# Patient Record
Sex: FEMALE | Race: OTHER | NOT HISPANIC OR LATINO | URBAN - METROPOLITAN AREA
[De-identification: names, ages, dates, MRNs, and addresses within clinical notes are randomized per-mention and may not be internally consistent; named-entity substitution may affect disease eponyms.]

---

## 2023-04-25 ENCOUNTER — EMERGENCY (EMERGENCY)
Facility: HOSPITAL | Age: 63
LOS: 1 days | Discharge: HOME | End: 2023-04-25
Attending: EMERGENCY MEDICINE
Payer: COMMERCIAL

## 2023-04-25 VITALS
TEMPERATURE: 101 F | RESPIRATION RATE: 20 BRPM | SYSTOLIC BLOOD PRESSURE: 173 MMHG | WEIGHT: 242.95 LBS | DIASTOLIC BLOOD PRESSURE: 90 MMHG | HEIGHT: 66 IN | HEART RATE: 91 BPM | OXYGEN SATURATION: 96 %

## 2023-04-25 DIAGNOSIS — Z90.49 ACQUIRED ABSENCE OF OTHER SPECIFIED PARTS OF DIGESTIVE TRACT: Chronic | ICD-10-CM

## 2023-04-25 LAB
ALBUMIN SERPL ELPH-MCNC: 4.5 G/DL — SIGNIFICANT CHANGE UP (ref 3.3–5)
ALP SERPL-CCNC: 67 U/L — SIGNIFICANT CHANGE UP (ref 40–120)
ALT FLD-CCNC: 29 U/L — SIGNIFICANT CHANGE UP (ref 10–45)
ANION GAP SERPL CALC-SCNC: 15 MMOL/L — SIGNIFICANT CHANGE UP (ref 5–17)
APPEARANCE UR: CLEAR — SIGNIFICANT CHANGE UP
AST SERPL-CCNC: 40 U/L — SIGNIFICANT CHANGE UP (ref 10–40)
BACTERIA # UR AUTO: NEGATIVE — SIGNIFICANT CHANGE UP
BASE EXCESS BLDV CALC-SCNC: 6.8 MMOL/L — HIGH (ref -2–3)
BASOPHILS # BLD AUTO: 0.04 K/UL — SIGNIFICANT CHANGE UP (ref 0–0.2)
BASOPHILS NFR BLD AUTO: 0.5 % — SIGNIFICANT CHANGE UP (ref 0–2)
BILIRUB SERPL-MCNC: 0.5 MG/DL — SIGNIFICANT CHANGE UP (ref 0.2–1.2)
BILIRUB UR-MCNC: NEGATIVE — SIGNIFICANT CHANGE UP
BLOOD GAS VENOUS - CREATININE: SIGNIFICANT CHANGE UP MG/DL (ref 0.5–1.3)
BUN SERPL-MCNC: 14 MG/DL — SIGNIFICANT CHANGE UP (ref 7–23)
CA-I SERPL-SCNC: 1.25 MMOL/L — SIGNIFICANT CHANGE UP (ref 1.15–1.33)
CALCIUM SERPL-MCNC: 10.3 MG/DL — SIGNIFICANT CHANGE UP (ref 8.4–10.5)
CHLORIDE BLDV-SCNC: 97 MMOL/L — SIGNIFICANT CHANGE UP (ref 96–108)
CHLORIDE SERPL-SCNC: 93 MMOL/L — LOW (ref 96–108)
CO2 BLDV-SCNC: 34 MMOL/L — HIGH (ref 22–26)
CO2 SERPL-SCNC: 27 MMOL/L — SIGNIFICANT CHANGE UP (ref 22–31)
COLOR SPEC: COLORLESS — SIGNIFICANT CHANGE UP
CREAT SERPL-MCNC: 0.66 MG/DL — SIGNIFICANT CHANGE UP (ref 0.5–1.3)
DIFF PNL FLD: ABNORMAL
EGFR: 100 ML/MIN/1.73M2 — SIGNIFICANT CHANGE UP
EOSINOPHIL # BLD AUTO: 0.03 K/UL — SIGNIFICANT CHANGE UP (ref 0–0.5)
EOSINOPHIL NFR BLD AUTO: 0.4 % — SIGNIFICANT CHANGE UP (ref 0–6)
EPI CELLS # UR: 2 /HPF — SIGNIFICANT CHANGE UP
GAS PNL BLDV: 134 MMOL/L — LOW (ref 136–145)
GAS PNL BLDV: SIGNIFICANT CHANGE UP
GAS PNL BLDV: SIGNIFICANT CHANGE UP
GLUCOSE BLDV-MCNC: 97 MG/DL — SIGNIFICANT CHANGE UP (ref 70–99)
GLUCOSE SERPL-MCNC: 95 MG/DL — SIGNIFICANT CHANGE UP (ref 70–99)
GLUCOSE UR QL: NEGATIVE — SIGNIFICANT CHANGE UP
HCO3 BLDV-SCNC: 33 MMOL/L — HIGH (ref 22–29)
HCT VFR BLD CALC: 38.4 % — SIGNIFICANT CHANGE UP (ref 34.5–45)
HCT VFR BLDA CALC: 33 % — LOW (ref 34.5–46.5)
HGB BLD CALC-MCNC: 11 G/DL — LOW (ref 11.7–16.1)
HGB BLD-MCNC: 12.3 G/DL — SIGNIFICANT CHANGE UP (ref 11.5–15.5)
HYALINE CASTS # UR AUTO: 0 /LPF — SIGNIFICANT CHANGE UP (ref 0–2)
IMM GRANULOCYTES NFR BLD AUTO: 0.4 % — SIGNIFICANT CHANGE UP (ref 0–0.9)
KETONES UR-MCNC: NEGATIVE — SIGNIFICANT CHANGE UP
LACTATE BLDV-MCNC: 1.4 MMOL/L — SIGNIFICANT CHANGE UP (ref 0.5–2)
LEUKOCYTE ESTERASE UR-ACNC: NEGATIVE — SIGNIFICANT CHANGE UP
LIDOCAIN IGE QN: 20 U/L — SIGNIFICANT CHANGE UP (ref 7–60)
LYMPHOCYTES # BLD AUTO: 2.09 K/UL — SIGNIFICANT CHANGE UP (ref 1–3.3)
LYMPHOCYTES # BLD AUTO: 28.2 % — SIGNIFICANT CHANGE UP (ref 13–44)
MCHC RBC-ENTMCNC: 29 PG — SIGNIFICANT CHANGE UP (ref 27–34)
MCHC RBC-ENTMCNC: 32 GM/DL — SIGNIFICANT CHANGE UP (ref 32–36)
MCV RBC AUTO: 90.6 FL — SIGNIFICANT CHANGE UP (ref 80–100)
MONOCYTES # BLD AUTO: 0.65 K/UL — SIGNIFICANT CHANGE UP (ref 0–0.9)
MONOCYTES NFR BLD AUTO: 8.8 % — SIGNIFICANT CHANGE UP (ref 2–14)
NEUTROPHILS # BLD AUTO: 4.58 K/UL — SIGNIFICANT CHANGE UP (ref 1.8–7.4)
NEUTROPHILS NFR BLD AUTO: 61.7 % — SIGNIFICANT CHANGE UP (ref 43–77)
NITRITE UR-MCNC: NEGATIVE — SIGNIFICANT CHANGE UP
NRBC # BLD: 0 /100 WBCS — SIGNIFICANT CHANGE UP (ref 0–0)
PCO2 BLDV: 53 MMHG — HIGH (ref 39–42)
PH BLDV: 7.4 — SIGNIFICANT CHANGE UP (ref 7.32–7.43)
PH UR: 6.5 — SIGNIFICANT CHANGE UP (ref 5–8)
PLATELET # BLD AUTO: 163 K/UL — SIGNIFICANT CHANGE UP (ref 150–400)
PO2 BLDV: 26 MMHG — SIGNIFICANT CHANGE UP (ref 25–45)
POTASSIUM BLDV-SCNC: 3.7 MMOL/L — SIGNIFICANT CHANGE UP (ref 3.5–5.1)
POTASSIUM SERPL-MCNC: 4.1 MMOL/L — SIGNIFICANT CHANGE UP (ref 3.5–5.3)
POTASSIUM SERPL-SCNC: 4.1 MMOL/L — SIGNIFICANT CHANGE UP (ref 3.5–5.3)
PROT SERPL-MCNC: 9.8 G/DL — HIGH (ref 6–8.3)
PROT UR-MCNC: NEGATIVE — SIGNIFICANT CHANGE UP
RAPID RVP RESULT: SIGNIFICANT CHANGE UP
RBC # BLD: 4.24 M/UL — SIGNIFICANT CHANGE UP (ref 3.8–5.2)
RBC # FLD: 13.2 % — SIGNIFICANT CHANGE UP (ref 10.3–14.5)
RBC CASTS # UR COMP ASSIST: 2 /HPF — SIGNIFICANT CHANGE UP (ref 0–4)
SAO2 % BLDV: 36.4 % — LOW (ref 67–88)
SARS-COV-2 RNA SPEC QL NAA+PROBE: SIGNIFICANT CHANGE UP
SODIUM SERPL-SCNC: 135 MMOL/L — SIGNIFICANT CHANGE UP (ref 135–145)
SP GR SPEC: 1.01 — LOW (ref 1.01–1.02)
TROPONIN T, HIGH SENSITIVITY RESULT: 45 NG/L — SIGNIFICANT CHANGE UP (ref 0–51)
TROPONIN T, HIGH SENSITIVITY RESULT: 46 NG/L — SIGNIFICANT CHANGE UP (ref 0–51)
UROBILINOGEN FLD QL: NEGATIVE — SIGNIFICANT CHANGE UP
WBC # BLD: 7.42 K/UL — SIGNIFICANT CHANGE UP (ref 3.8–10.5)
WBC # FLD AUTO: 7.42 K/UL — SIGNIFICANT CHANGE UP (ref 3.8–10.5)
WBC UR QL: 1 /HPF — SIGNIFICANT CHANGE UP (ref 0–5)

## 2023-04-25 PROCEDURE — 99285 EMERGENCY DEPT VISIT HI MDM: CPT

## 2023-04-25 PROCEDURE — 71045 X-RAY EXAM CHEST 1 VIEW: CPT | Mod: 26

## 2023-04-25 RX ORDER — POTASSIUM CHLORIDE 20 MEQ
40 PACKET (EA) ORAL ONCE
Refills: 0 | Status: COMPLETED | OUTPATIENT
Start: 2023-04-25 | End: 2023-04-25

## 2023-04-25 NOTE — ED PROVIDER NOTE - ATTENDING CONTRIBUTION TO CARE
Patient is a 62-year-old female with a history of hypertension, asthma, history of cholecystectomy here for evaluation of indigestion, bloating and fatigue x 3 days.  Patient states that they went to some event on Saturday and she felt that the food had too much garlic which causes indigestion.  The following day she had a lot of bloating and gas.  She reports that she was burping and passing gas repeatedly.  She also reports a less than 1 minute episode where she felt dizzy while sitting on the couch.  Patient states that she went online and check Web MD and decided that she should probably get checked out because of a strong cardiac history in her family.  Patient went to urgent care and was advised to come to the emergency department for further evaluation.  She reports that she follows with Dr. Grossman and had a echo and stress test that was normal in December 2022.  Patient denies any chest pain with exertion or dyspnea on exertion.  She denies any leg swelling.  She also reports that it was a recent death in the family which may have added to the stress of her situation.  She denies any fevers, vomiting, diarrhea.  She denies any urinary symptoms.    VS noted  Gen. no acute distress, Non toxic   HEENT: EOMI, mmm  Lungs: CTAB/L no C/ W /R   CVS: RRR   Abd; Soft non tender, non distended   Ext: no edema  Skin: no rash  Neuro AAOx3 non focal clear speech  a/p:   Indigestion–patient was sent from urgent care for concern of abnormal EKG.  EKG is reviewed and is low voltage.  EKG done here shows no ST elevations or depressions.  Plan for labs including troponin.  Given patient recently had a stress and echo that was within normal limits, if  delta troponin is normal, plan for discharge and instructions for outpatient follow-up.  - Roxana BIRCH

## 2023-04-25 NOTE — ED PROVIDER NOTE - OBJECTIVE STATEMENT
62-year-old female with past medical history of asthma, hypertension, cholecystectomy, here for 2 days of bloating, fatigue, mild shortness of breath.  She went to an urgent care today for evaluation and was sent to the emergency department due to an abnormal EKG ( interpreted EKG as low voltage QRS with flattened T waves in precordials). She denies chest pain and does not have worsened SOB with exertion. She recently underwent a stress test with her cardiologist in December which was unremarkable.

## 2023-04-25 NOTE — ED PROVIDER NOTE - RAPID ASSESSMENT
pt is a 61 y/o female from UC, htn, surgery choley p/w with bloating, Indigestion.  Mild shortness of breath, No chest pain. mild nauseous, no diarrhea, febrile here, no sick contacts, no uri sts. fatigue all started the past few days now. rapid covid neg,  dr gandara in dec echo, stress which was nl. urinary freq with pos ua from urgent care.     *** I, Shane Fuentes MD, performed an initial face to face bedside interview with this patient regarding history of present illness and determined that the patient should be evaluated in the main ED. A physical exam was not performed due to private space availability. This patient's evaluation is NOT COMPLETE and only preliminary. The full assessment, management, and reassessment of this patient, including but not limited to the follow up of ordered laboratory and radiologic testing, is deferred to the main ED provider. ***

## 2023-04-25 NOTE — ED PROVIDER NOTE - PROGRESS NOTE DETAILS
Jean Morgan MD, PGY-1: First troponin 45, will obtain second for correlation. Jean Morgan MD, PGY-1: Second troponin lateral, patient safe for discharge.

## 2023-04-25 NOTE — ED PROVIDER NOTE - CLINICAL SUMMARY MEDICAL DECISION MAKING FREE TEXT BOX
62F here for 2 days fatigue, SOB, bloating. Exam shows no abdominal tenderness and clear lungs. EKG is unremarkable. Patient already has done recent cardiac workup and can follow up with cardiologist. Labs were ordered by [facility] and do not show evidence of pancreatitis, infection, anemia, or electrolyte abnormality that would explain abdominal discomfort or fatigue. Patient can follow up with cardiologist.

## 2023-04-25 NOTE — ED ADULT TRIAGE NOTE - CHIEF COMPLAINT QUOTE
sent from  for abnormal EKG, pt c/o abdominal distention, fevers, intermittent SOB and generalized fatigue beginning 2 days ago.

## 2023-04-25 NOTE — ED PROVIDER NOTE - NSFOLLOWUPINSTRUCTIONS_ED_ALL_ED_FT
You were seen in the emergency department for abdominal discomfort. We have evaluated you and determined that you do not require further hospital interventions.    During your stay you had the following relevant results: an EKG that does not show evidence of a heart attack, lab work that shows normal electrolyte levels and normal red blood cell count    Please follow up with your CARDIOLOGIST to discuss the results of your stay in our department.    If you start to experience worsening symptoms such as chest pain (especially with nausea or vomiting), difficulty breathing, high-grade fevers (>= 103 °F) that do not respond to medication, please return to the emergency department for further evaluation.

## 2023-04-25 NOTE — ED PROVIDER NOTE - PATIENT PORTAL LINK FT
You can access the FollowMyHealth Patient Portal offered by [facility] by registering at the following website: [email or web address]. By joining [facility]’s FollowMyHealth portal, you will also be able to view your health information using other applications (apps) compatible with our system.

## 2023-04-26 VITALS
RESPIRATION RATE: 16 BRPM | TEMPERATURE: 99 F | DIASTOLIC BLOOD PRESSURE: 74 MMHG | HEART RATE: 77 BPM | OXYGEN SATURATION: 98 % | SYSTOLIC BLOOD PRESSURE: 126 MMHG

## 2023-04-26 PROCEDURE — 82435 ASSAY OF BLOOD CHLORIDE: CPT

## 2023-04-26 PROCEDURE — 83605 ASSAY OF LACTIC ACID: CPT

## 2023-04-26 PROCEDURE — 82803 BLOOD GASES ANY COMBINATION: CPT

## 2023-04-26 PROCEDURE — 87086 URINE CULTURE/COLONY COUNT: CPT

## 2023-04-26 PROCEDURE — 82330 ASSAY OF CALCIUM: CPT

## 2023-04-26 PROCEDURE — 85018 HEMOGLOBIN: CPT

## 2023-04-26 PROCEDURE — 81001 URINALYSIS AUTO W/SCOPE: CPT

## 2023-04-26 PROCEDURE — 80048 BASIC METABOLIC PNL TOTAL CA: CPT

## 2023-04-26 PROCEDURE — 85025 COMPLETE CBC W/AUTO DIFF WBC: CPT

## 2023-04-26 PROCEDURE — 0225U NFCT DS DNA&RNA 21 SARSCOV2: CPT

## 2023-04-26 PROCEDURE — 93005 ELECTROCARDIOGRAM TRACING: CPT

## 2023-04-26 PROCEDURE — 85014 HEMATOCRIT: CPT

## 2023-04-26 PROCEDURE — 84484 ASSAY OF TROPONIN QUANT: CPT

## 2023-04-26 PROCEDURE — 82565 ASSAY OF CREATININE: CPT

## 2023-04-26 PROCEDURE — 83690 ASSAY OF LIPASE: CPT

## 2023-04-26 PROCEDURE — 84295 ASSAY OF SERUM SODIUM: CPT

## 2023-04-26 PROCEDURE — 84132 ASSAY OF SERUM POTASSIUM: CPT

## 2023-04-26 PROCEDURE — 99285 EMERGENCY DEPT VISIT HI MDM: CPT | Mod: 25

## 2023-04-26 PROCEDURE — 71045 X-RAY EXAM CHEST 1 VIEW: CPT

## 2023-04-26 PROCEDURE — 80053 COMPREHEN METABOLIC PANEL: CPT

## 2023-04-26 PROCEDURE — 82947 ASSAY GLUCOSE BLOOD QUANT: CPT

## 2023-04-26 RX ADMIN — Medication 40 MILLIEQUIVALENT(S): at 00:24

## 2023-04-26 NOTE — ED ADULT NURSE NOTE - CHPI ED NUR SYMPTOMS NEG
no back pain/no chest pain/no chills/no congestion/no diaphoresis/no dizziness/no nausea/no syncope/no vomiting

## 2023-04-26 NOTE — ED ADULT NURSE NOTE - OBJECTIVE STATEMENT
62 y/o Female presents to ED from urgent care for abn EKG. PMH asthma, HTN, cholecystectomy. Pt states she has had bloating and SOB on exertion for the past 2 days. Pt was seen at urgent care and sent to ED for abn EKG. Pt denied chest pain, numbness or tingling, HA, cough, dizziness, vision changes, N/V/D, urinary s/s. Endorses bloated feeling and gassy. Pt is A&Ox4, well appearing/ speaking full sentences without difficulty. Airway patent with spontaneous unlabored breathing, Equal B/L upper and lower extremity strength, skin is warm and normal color for race. No diaphoresis noted. Pt placed on continuous cardiac monitor. Safety maintained bed is in the lowest position, locked and call bell in reach.

## 2023-04-27 LAB
CULTURE RESULTS: SIGNIFICANT CHANGE UP
SPECIMEN SOURCE: SIGNIFICANT CHANGE UP

## 2024-02-17 ENCOUNTER — NON-APPOINTMENT (OUTPATIENT)
Age: 64
End: 2024-02-17

## 2024-02-28 PROBLEM — I10 ESSENTIAL (PRIMARY) HYPERTENSION: Chronic | Status: ACTIVE | Noted: 2023-04-25

## 2024-02-28 PROBLEM — J45.909 UNSPECIFIED ASTHMA, UNCOMPLICATED: Chronic | Status: ACTIVE | Noted: 2023-04-25

## 2024-02-29 PROBLEM — Z00.00 ENCOUNTER FOR PREVENTIVE HEALTH EXAMINATION: Status: ACTIVE | Noted: 2024-02-29

## 2024-03-06 ENCOUNTER — APPOINTMENT (OUTPATIENT)
Dept: GYNECOLOGIC ONCOLOGY | Facility: CLINIC | Age: 64
End: 2024-03-06

## 2024-03-16 ENCOUNTER — INPATIENT (INPATIENT)
Facility: HOSPITAL | Age: 64
LOS: 3 days | Discharge: HOME HEALTH | DRG: 512 | End: 2024-03-20
Attending: OBSTETRICS & GYNECOLOGY | Admitting: OBSTETRICS & GYNECOLOGY
Payer: MEDICAID

## 2024-03-16 VITALS
TEMPERATURE: 98 F | HEIGHT: 66 IN | HEART RATE: 68 BPM | SYSTOLIC BLOOD PRESSURE: 168 MMHG | DIASTOLIC BLOOD PRESSURE: 82 MMHG | RESPIRATION RATE: 18 BRPM | WEIGHT: 238.98 LBS | OXYGEN SATURATION: 99 %

## 2024-03-16 DIAGNOSIS — Z90.49 ACQUIRED ABSENCE OF OTHER SPECIFIED PARTS OF DIGESTIVE TRACT: Chronic | ICD-10-CM

## 2024-03-16 LAB
ALBUMIN SERPL ELPH-MCNC: 4 G/DL — SIGNIFICANT CHANGE UP (ref 3.5–5.2)
ALP SERPL-CCNC: 57 U/L — SIGNIFICANT CHANGE UP (ref 30–115)
ALT FLD-CCNC: 36 U/L — SIGNIFICANT CHANGE UP (ref 0–41)
ANION GAP SERPL CALC-SCNC: 12 MMOL/L — SIGNIFICANT CHANGE UP (ref 7–14)
AST SERPL-CCNC: 34 U/L — SIGNIFICANT CHANGE UP (ref 0–41)
BILIRUB SERPL-MCNC: 0.2 MG/DL — SIGNIFICANT CHANGE UP (ref 0.2–1.2)
BLD GP AB SCN SERPL QL: SIGNIFICANT CHANGE UP
BUN SERPL-MCNC: 12 MG/DL — SIGNIFICANT CHANGE UP (ref 10–20)
CALCIUM SERPL-MCNC: 9.4 MG/DL — SIGNIFICANT CHANGE UP (ref 8.4–10.5)
CHLORIDE SERPL-SCNC: 103 MMOL/L — SIGNIFICANT CHANGE UP (ref 98–110)
CO2 SERPL-SCNC: 25 MMOL/L — SIGNIFICANT CHANGE UP (ref 17–32)
CREAT SERPL-MCNC: 0.6 MG/DL — LOW (ref 0.7–1.5)
EGFR: 101 ML/MIN/1.73M2 — SIGNIFICANT CHANGE UP
GLUCOSE SERPL-MCNC: 150 MG/DL — HIGH (ref 70–99)
LIDOCAIN IGE QN: 22 U/L — SIGNIFICANT CHANGE UP (ref 7–60)
POTASSIUM SERPL-MCNC: 3.7 MMOL/L — SIGNIFICANT CHANGE UP (ref 3.5–5)
POTASSIUM SERPL-SCNC: 3.7 MMOL/L — SIGNIFICANT CHANGE UP (ref 3.5–5)
PROT SERPL-MCNC: 7.6 G/DL — SIGNIFICANT CHANGE UP (ref 6–8)
SODIUM SERPL-SCNC: 140 MMOL/L — SIGNIFICANT CHANGE UP (ref 135–146)

## 2024-03-16 PROCEDURE — 99285 EMERGENCY DEPT VISIT HI MDM: CPT

## 2024-03-16 RX ORDER — SODIUM CHLORIDE 9 MG/ML
1000 INJECTION, SOLUTION INTRAVENOUS ONCE
Refills: 0 | Status: COMPLETED | OUTPATIENT
Start: 2024-03-16 | End: 2024-03-16

## 2024-03-16 RX ORDER — IOHEXOL 300 MG/ML
30 INJECTION, SOLUTION INTRAVENOUS ONCE
Refills: 0 | Status: COMPLETED | OUTPATIENT
Start: 2024-03-16 | End: 2024-03-17

## 2024-03-16 RX ADMIN — SODIUM CHLORIDE 1000 MILLILITER(S): 9 INJECTION, SOLUTION INTRAVENOUS at 22:03

## 2024-03-17 DIAGNOSIS — N93.9 ABNORMAL UTERINE AND VAGINAL BLEEDING, UNSPECIFIED: ICD-10-CM

## 2024-03-17 DIAGNOSIS — Z98.891 HISTORY OF UTERINE SCAR FROM PREVIOUS SURGERY: Chronic | ICD-10-CM

## 2024-03-17 LAB
ALBUMIN SERPL ELPH-MCNC: 3.9 G/DL — SIGNIFICANT CHANGE UP (ref 3.5–5.2)
ALP SERPL-CCNC: 55 U/L — SIGNIFICANT CHANGE UP (ref 30–115)
ALT FLD-CCNC: 32 U/L — SIGNIFICANT CHANGE UP (ref 0–41)
ANION GAP SERPL CALC-SCNC: 11 MMOL/L — SIGNIFICANT CHANGE UP (ref 7–14)
APTT BLD: 27.2 SEC — SIGNIFICANT CHANGE UP (ref 27–39.2)
AST SERPL-CCNC: 28 U/L — SIGNIFICANT CHANGE UP (ref 0–41)
BASOPHILS # BLD AUTO: 0.03 K/UL — SIGNIFICANT CHANGE UP (ref 0–0.2)
BASOPHILS # BLD AUTO: 0.04 K/UL — SIGNIFICANT CHANGE UP (ref 0–0.2)
BASOPHILS NFR BLD AUTO: 0.4 % — SIGNIFICANT CHANGE UP (ref 0–1)
BASOPHILS NFR BLD AUTO: 0.7 % — SIGNIFICANT CHANGE UP (ref 0–1)
BILIRUB SERPL-MCNC: 0.4 MG/DL — SIGNIFICANT CHANGE UP (ref 0.2–1.2)
BLD GP AB SCN SERPL QL: SIGNIFICANT CHANGE UP
BUN SERPL-MCNC: 8 MG/DL — LOW (ref 10–20)
CALCIUM SERPL-MCNC: 9 MG/DL — SIGNIFICANT CHANGE UP (ref 8.4–10.4)
CHLORIDE SERPL-SCNC: 102 MMOL/L — SIGNIFICANT CHANGE UP (ref 98–110)
CO2 SERPL-SCNC: 26 MMOL/L — SIGNIFICANT CHANGE UP (ref 17–32)
CREAT SERPL-MCNC: 0.6 MG/DL — LOW (ref 0.7–1.5)
EGFR: 101 ML/MIN/1.73M2 — SIGNIFICANT CHANGE UP
EOSINOPHIL # BLD AUTO: 0.01 K/UL — SIGNIFICANT CHANGE UP (ref 0–0.7)
EOSINOPHIL # BLD AUTO: 0.04 K/UL — SIGNIFICANT CHANGE UP (ref 0–0.7)
EOSINOPHIL NFR BLD AUTO: 0.1 % — SIGNIFICANT CHANGE UP (ref 0–8)
EOSINOPHIL NFR BLD AUTO: 0.7 % — SIGNIFICANT CHANGE UP (ref 0–8)
GLUCOSE SERPL-MCNC: 101 MG/DL — HIGH (ref 70–99)
HCT VFR BLD CALC: 32.9 % — LOW (ref 37–47)
HCT VFR BLD CALC: 33.6 % — LOW (ref 37–47)
HGB BLD-MCNC: 10.9 G/DL — LOW (ref 12–16)
HGB BLD-MCNC: 11.1 G/DL — LOW (ref 12–16)
IMM GRANULOCYTES NFR BLD AUTO: 0.2 % — SIGNIFICANT CHANGE UP (ref 0.1–0.3)
IMM GRANULOCYTES NFR BLD AUTO: 0.3 % — SIGNIFICANT CHANGE UP (ref 0.1–0.3)
INR BLD: 1.09 RATIO — SIGNIFICANT CHANGE UP (ref 0.65–1.3)
LYMPHOCYTES # BLD AUTO: 1.7 K/UL — SIGNIFICANT CHANGE UP (ref 1.2–3.4)
LYMPHOCYTES # BLD AUTO: 2.03 K/UL — SIGNIFICANT CHANGE UP (ref 1.2–3.4)
LYMPHOCYTES # BLD AUTO: 25.4 % — SIGNIFICANT CHANGE UP (ref 20.5–51.1)
LYMPHOCYTES # BLD AUTO: 35.6 % — SIGNIFICANT CHANGE UP (ref 20.5–51.1)
MAGNESIUM SERPL-MCNC: 2.2 MG/DL — SIGNIFICANT CHANGE UP (ref 1.8–2.4)
MCHC RBC-ENTMCNC: 29.2 PG — SIGNIFICANT CHANGE UP (ref 27–31)
MCHC RBC-ENTMCNC: 29.4 PG — SIGNIFICANT CHANGE UP (ref 27–31)
MCHC RBC-ENTMCNC: 33 G/DL — SIGNIFICANT CHANGE UP (ref 32–37)
MCHC RBC-ENTMCNC: 33.1 G/DL — SIGNIFICANT CHANGE UP (ref 32–37)
MCV RBC AUTO: 88.4 FL — SIGNIFICANT CHANGE UP (ref 81–99)
MCV RBC AUTO: 88.7 FL — SIGNIFICANT CHANGE UP (ref 81–99)
MONOCYTES # BLD AUTO: 0.42 K/UL — SIGNIFICANT CHANGE UP (ref 0.1–0.6)
MONOCYTES # BLD AUTO: 0.44 K/UL — SIGNIFICANT CHANGE UP (ref 0.1–0.6)
MONOCYTES NFR BLD AUTO: 6.3 % — SIGNIFICANT CHANGE UP (ref 1.7–9.3)
MONOCYTES NFR BLD AUTO: 7.7 % — SIGNIFICANT CHANGE UP (ref 1.7–9.3)
NEUTROPHILS # BLD AUTO: 3.15 K/UL — SIGNIFICANT CHANGE UP (ref 1.4–6.5)
NEUTROPHILS # BLD AUTO: 4.51 K/UL — SIGNIFICANT CHANGE UP (ref 1.4–6.5)
NEUTROPHILS NFR BLD AUTO: 55.1 % — SIGNIFICANT CHANGE UP (ref 42.2–75.2)
NEUTROPHILS NFR BLD AUTO: 67.5 % — SIGNIFICANT CHANGE UP (ref 42.2–75.2)
NRBC # BLD: 0 /100 WBCS — SIGNIFICANT CHANGE UP (ref 0–0)
NRBC # BLD: 0 /100 WBCS — SIGNIFICANT CHANGE UP (ref 0–0)
PHOSPHATE SERPL-MCNC: 4.1 MG/DL — SIGNIFICANT CHANGE UP (ref 2.1–4.9)
PLATELET # BLD AUTO: 186 K/UL — SIGNIFICANT CHANGE UP (ref 130–400)
PLATELET # BLD AUTO: 188 K/UL — SIGNIFICANT CHANGE UP (ref 130–400)
PMV BLD: 10.5 FL — HIGH (ref 7.4–10.4)
PMV BLD: 10.8 FL — HIGH (ref 7.4–10.4)
POTASSIUM SERPL-MCNC: 3.6 MMOL/L — SIGNIFICANT CHANGE UP (ref 3.5–5)
POTASSIUM SERPL-SCNC: 3.6 MMOL/L — SIGNIFICANT CHANGE UP (ref 3.5–5)
PROT SERPL-MCNC: 7.2 G/DL — SIGNIFICANT CHANGE UP (ref 6–8)
PROTHROM AB SERPL-ACNC: 12.4 SEC — SIGNIFICANT CHANGE UP (ref 9.95–12.87)
RBC # BLD: 3.71 M/UL — LOW (ref 4.2–5.4)
RBC # BLD: 3.8 M/UL — LOW (ref 4.2–5.4)
RBC # FLD: 13.2 % — SIGNIFICANT CHANGE UP (ref 11.5–14.5)
RBC # FLD: 13.3 % — SIGNIFICANT CHANGE UP (ref 11.5–14.5)
SODIUM SERPL-SCNC: 139 MMOL/L — SIGNIFICANT CHANGE UP (ref 135–146)
WBC # BLD: 5.71 K/UL — SIGNIFICANT CHANGE UP (ref 4.8–10.8)
WBC # BLD: 6.69 K/UL — SIGNIFICANT CHANGE UP (ref 4.8–10.8)
WBC # FLD AUTO: 5.71 K/UL — SIGNIFICANT CHANGE UP (ref 4.8–10.8)
WBC # FLD AUTO: 6.69 K/UL — SIGNIFICANT CHANGE UP (ref 4.8–10.8)

## 2024-03-17 PROCEDURE — 74177 CT ABD & PELVIS W/CONTRAST: CPT | Mod: 26

## 2024-03-17 PROCEDURE — 36415 COLL VENOUS BLD VENIPUNCTURE: CPT

## 2024-03-17 PROCEDURE — 93005 ELECTROCARDIOGRAM TRACING: CPT

## 2024-03-17 PROCEDURE — 88309 TISSUE EXAM BY PATHOLOGIST: CPT

## 2024-03-17 PROCEDURE — 88305 TISSUE EXAM BY PATHOLOGIST: CPT

## 2024-03-17 PROCEDURE — 86850 RBC ANTIBODY SCREEN: CPT

## 2024-03-17 PROCEDURE — 83735 ASSAY OF MAGNESIUM: CPT

## 2024-03-17 PROCEDURE — 76856 US EXAM PELVIC COMPLETE: CPT | Mod: 26

## 2024-03-17 PROCEDURE — C9399: CPT

## 2024-03-17 PROCEDURE — 84100 ASSAY OF PHOSPHORUS: CPT

## 2024-03-17 PROCEDURE — 85730 THROMBOPLASTIN TIME PARTIAL: CPT

## 2024-03-17 PROCEDURE — 85610 PROTHROMBIN TIME: CPT

## 2024-03-17 PROCEDURE — 99222 1ST HOSP IP/OBS MODERATE 55: CPT | Mod: GC,57

## 2024-03-17 PROCEDURE — 86900 BLOOD TYPING SEROLOGIC ABO: CPT

## 2024-03-17 PROCEDURE — 86803 HEPATITIS C AB TEST: CPT

## 2024-03-17 PROCEDURE — 80053 COMPREHEN METABOLIC PANEL: CPT

## 2024-03-17 PROCEDURE — 83036 HEMOGLOBIN GLYCOSYLATED A1C: CPT

## 2024-03-17 PROCEDURE — 85025 COMPLETE CBC W/AUTO DIFF WBC: CPT

## 2024-03-17 PROCEDURE — 99232 SBSQ HOSP IP/OBS MODERATE 35: CPT

## 2024-03-17 PROCEDURE — 80048 BASIC METABOLIC PNL TOTAL CA: CPT

## 2024-03-17 PROCEDURE — 88307 TISSUE EXAM BY PATHOLOGIST: CPT

## 2024-03-17 PROCEDURE — 93010 ELECTROCARDIOGRAM REPORT: CPT

## 2024-03-17 PROCEDURE — 74177 CT ABD & PELVIS W/CONTRAST: CPT | Mod: MC

## 2024-03-17 PROCEDURE — 86901 BLOOD TYPING SEROLOGIC RH(D): CPT

## 2024-03-17 PROCEDURE — 88341 IMHCHEM/IMCYTCHM EA ADD ANTB: CPT

## 2024-03-17 PROCEDURE — 88342 IMHCHEM/IMCYTCHM 1ST ANTB: CPT

## 2024-03-17 RX ORDER — TRIAMTERENE/HYDROCHLOROTHIAZID 75 MG-50MG
1 TABLET ORAL DAILY
Refills: 0 | Status: DISCONTINUED | OUTPATIENT
Start: 2024-03-17 | End: 2024-03-17

## 2024-03-17 RX ORDER — IBUPROFEN 200 MG
600 TABLET ORAL EVERY 6 HOURS
Refills: 0 | Status: DISCONTINUED | OUTPATIENT
Start: 2024-03-17 | End: 2024-03-18

## 2024-03-17 RX ORDER — ACETAMINOPHEN 500 MG
975 TABLET ORAL EVERY 6 HOURS
Refills: 0 | Status: DISCONTINUED | OUTPATIENT
Start: 2024-03-17 | End: 2024-03-18

## 2024-03-17 RX ORDER — ACETAMINOPHEN 500 MG
1000 TABLET ORAL ONCE
Refills: 0 | Status: COMPLETED | OUTPATIENT
Start: 2024-03-18 | End: 2024-03-18

## 2024-03-17 RX ORDER — HEPARIN SODIUM 5000 [USP'U]/ML
5000 INJECTION INTRAVENOUS; SUBCUTANEOUS EVERY 8 HOURS
Refills: 0 | Status: DISCONTINUED | OUTPATIENT
Start: 2024-03-18 | End: 2024-03-18

## 2024-03-17 RX ORDER — SODIUM CHLORIDE 9 MG/ML
1000 INJECTION, SOLUTION INTRAVENOUS
Refills: 0 | Status: DISCONTINUED | OUTPATIENT
Start: 2024-03-17 | End: 2024-03-17

## 2024-03-17 RX ORDER — SODIUM CHLORIDE 9 MG/ML
1000 INJECTION, SOLUTION INTRAVENOUS
Refills: 0 | Status: DISCONTINUED | OUTPATIENT
Start: 2024-03-17 | End: 2024-03-18

## 2024-03-17 RX ADMIN — Medication 600 MILLIGRAM(S): at 18:31

## 2024-03-17 RX ADMIN — IOHEXOL 30 MILLILITER(S): 300 INJECTION, SOLUTION INTRAVENOUS at 00:42

## 2024-03-17 RX ADMIN — SODIUM CHLORIDE 75 MILLILITER(S): 9 INJECTION, SOLUTION INTRAVENOUS at 05:35

## 2024-03-17 RX ADMIN — Medication 1 TABLET(S): at 08:56

## 2024-03-17 RX ADMIN — Medication 975 MILLIGRAM(S): at 14:50

## 2024-03-17 RX ADMIN — Medication 975 MILLIGRAM(S): at 13:58

## 2024-03-17 RX ADMIN — Medication 600 MILLIGRAM(S): at 19:15

## 2024-03-17 NOTE — ED PROVIDER NOTE - CONSIDERATION OF ADMISSION OBSERVATION
Pt to be admitted for post-menopausal vaginal bleeding and abnormal US. Consideration of Admission/Observation

## 2024-03-17 NOTE — ED PROVIDER NOTE - PROGRESS NOTE DETAILS
ss OB/GYN evaluated.  Recommending admission and possibly will have surgery during admission.  Recommending CT abdomen pelvis and OB/GYN will follow results.

## 2024-03-17 NOTE — H&P ADULT - NSICDXPASTMEDICALHX_GEN_ALL_CORE_FT
PAST MEDICAL HISTORY:  Asthma     Fibroid uterus     H/O sleep apnea     Hypertension     Obesity

## 2024-03-17 NOTE — ED PROVIDER NOTE - ATTENDING CONTRIBUTION TO CARE
63 y.o. female, postmenopausal since 55yo, with PMHx HTN, prediabetes, obesity, sleep apnea, fibroid uterus presents with postmenopausal bleeding. Present and worsening over the last few months. Since this afternoon, passing quarter sized clots that are soaking through her maxi pads and staining the sides of her underwear. Associated with significant pelvic pressure, cramping, and nausea. Denies HA, CP, SOB, fevers, chills. Reports increased urinary frequency due to the pressure and as well as constipation. Throughout the day, she has been feeling unwell due to the above symptoms, she is scheduled to go on a cruise tomorrow but no longer feels well enough to go. Has had a few sonograms done outpatient which show a thickened endometrial lining and fibroids that are gradually growing in size. She is scheduled to see Dr De Oliveira in April. On exam, pt in NAD, AAOx3, head NC/AT, CN II-XII intact, PEERL, EOMi, neck (-) midline tenderness, lungs CTA B/L, CV S1S2 regular, abdomen soft/NT/ND/(+)BS,  (+) blood in the vault, (-) CMT, (-) lesions, ext (-) edema, motor 5/5x4, sensation intact, ambulating with steady gait. Labs/US reviewed. GYN consulted. Request admission and CT with IV/PO contrast. GYN will follow results.

## 2024-03-17 NOTE — PATIENT PROFILE ADULT - FALL HARM RISK - HARM RISK INTERVENTIONS

## 2024-03-17 NOTE — H&P ADULT - NSHPPHYSICALEXAM_GEN_ALL_CORE
General Appearance - AAOx3, tired and in pain  Abdomen - Soft, obese, nontender, nondistended, no rebound, no rigidity, no guarding     GYN/Pelvis:  Labia Majora - Normal  Labia Minora - Normal  Clitoris - Normal  Urethra - Normal  Vagina - No abnormal lesions. Approx 5cc of old dark red blood in the vault  Cervix - No abnormal lesions. Slow active oozing of dark red blood from the cervix, approx 20cc collected    Uterus:  Size - 16wk, bulky    Adnexa:  Masses - None  Tenderness - None

## 2024-03-17 NOTE — PATIENT PROFILE ADULT - FUNCTIONAL ASSESSMENT - DAILY ACTIVITY 4.
4 = No assist / stand by assistance
Supine, bilateral LEs: glute sets, quad sets, heel slides, ankle pumps (all through full range, 1 set, 10 reps). Patient tolerated therex well. Educated patient to perform therex regimen 3-5x/day, patient verbalized understanding; written handout provided.

## 2024-03-17 NOTE — H&P ADULT - HISTORY OF PRESENT ILLNESS
64yo P2   postmenopausal since 55yo, with PMHx HTN, prediabetes, obesity, sleep apnea, fibroid uterus presents with postmenopausal bleeding. Present and worsening over the last few months. Since this afternoon, passing quarter sized clots that are soaking through her maxi pads and staining the sides of her underwear. Associated with significant pelvic pressure, cramping, and nausea. Denies HA, CP, SOB, fevers, chills. Reports increased urinary frequency due to the pressure and as well as constipation. Throughout the day, she has been feeling unwell due to the above symptoms, she is scheduled to go on a cruise tomorrow but no longer feels well enough to go. She previoulsy had an EMBx done but there was insufficent tissue sampling. Has had a few sonograms done outpatient which show a thickened endometrial lining and fibroids that are gradually growing in size. She is scheduled to see Dr De Oliveira in April.       Ob hx: FT C/S x2 uncomplicated  Gyn hx: Known fibroid uterus, have been gradually growing in size. Denies ovarian cysts, abnl paps, STIs    All: Erythromycin - nausea, diarrhea. Penicillin - childhood allergy  Home Meds: Triamterine-HCTZ 37.5-25 qD. Levocetrizine 5mg PRN for sinus pain. Meclizine 12.5mg PRH for vertigo.   PSHx: Open cholecystectomy. C/S x2.   PMHx: Prediabetes. Obesity. HTN. Fibroid uterus  FHx: Father - DM.   Social: Denies smoking, drugs, and alcohol    4/20/2022 TVUS:  Uterus 14.18cm  Fibroids. Largest right subseorsal 5.4x6x5.9, intramural 5.1x5.2x5, left fundal intramural 4.4x3.5x4.   Endometrium could not be evaluated    4/14/2023   Pap NILM. HPV HR neg.   EMBx: insufficient sampling    2/23/2024 TAUS:  Anteverted uterus 16.8cm. Endometrial echo markedly thickened at 4.5cm. Echogenic lesion within the endometrial cavity approximately 7.4cm. Fibroid uterus, largest is subserosal fundal 8.2cm, another anterior mid subserosal 5.3cm

## 2024-03-17 NOTE — ED PROVIDER NOTE - OBJECTIVE STATEMENT
63-year-old female with a history of asthma, hypertension, fibroids presents with vaginal bleeding.  Patient says she has been in menopause for the last 2 years.  Today began having vaginal bleeding with clots and associated with abdominal pain.  1 episode of vomiting prior to arrival secondary to abdominal cramping pain.  Had ultrasound few months ago and was found to have fibroids.  Had normal Pap smear within the year.  Follows up with OB/GYN Dr. Clemens  Accompanied by

## 2024-03-17 NOTE — ED PROVIDER NOTE - PHYSICAL EXAMINATION
CONSTITUTIONAL: NAD  SKIN: Warm dry  HEAD: NCAT  EYES: NL inspection  ENT: MMM  NECK: Supple; non tender.  CARD: RRR  RESP: CTAB  ABD: S/NT no R/G  EXT: no pedal edema  NEURO: Grossly unremarkable  PSYCH: Cooperative, appropriate.  : chaperone Dr Marin +scant bleeding vaginal vault, negative bimanual exam

## 2024-03-17 NOTE — H&P ADULT - ASSESSMENT
62yo P2  postmenopausal since 53yo, with PMHx HTN, prediabetes, obesity, sleep apnea, fibroid uterus, with postmenopausal bleeding r/o malignancy  - Admit to GynOnc service  - Monitor vitals   - Regular PO diet  - Tylenol and Motrin PRN  - Home meds  - F/u CBC, TVUS  - F/u CT Abd/Pelvis with PO contrast and IV contrast  - AM Labs: CBC, CMP, Mg, Ph, Coags, A1C  - EKG  - Plan to scheduled for hysterectomy on Monday 3/18/24     D/w Dr De Oliveira

## 2024-03-17 NOTE — H&P ADULT - ATTENDING COMMENTS
Pt seen and examined at bedside, discussed with pt the recommendations for hysterectomy, BSO and possible surgical staging. Pt seen and examined at bedside, discussed with pt the recommendations for hysterectomy, BSO and possible surgical staging.    Discussed with patient risks and benefits of procedure. Risks includes but not limited to infection, bleeding, DVT/PE, and knows all measures will be taken to minimize these risks but cannot be eliminated 100% of the time. Risks of injury to surrounding structures were also discussed with patient which include bladder, ureters, bowel and/or blood vessels and these injuries will be repaired immediately however some injuries may not present until postoperatively and may require reoperation.

## 2024-03-18 ENCOUNTER — RESULT REVIEW (OUTPATIENT)
Age: 64
End: 2024-03-18

## 2024-03-18 LAB
A1C WITH ESTIMATED AVERAGE GLUCOSE RESULT: 6 % — HIGH (ref 4–5.6)
ALBUMIN SERPL ELPH-MCNC: 3.8 G/DL — SIGNIFICANT CHANGE UP (ref 3.5–5.2)
ALP SERPL-CCNC: 91 U/L — SIGNIFICANT CHANGE UP (ref 30–115)
ALT FLD-CCNC: 137 U/L — HIGH (ref 0–41)
ANION GAP SERPL CALC-SCNC: 10 MMOL/L — SIGNIFICANT CHANGE UP (ref 7–14)
ANION GAP SERPL CALC-SCNC: 8 MMOL/L — SIGNIFICANT CHANGE UP (ref 7–14)
AST SERPL-CCNC: 179 U/L — HIGH (ref 0–41)
BASOPHILS # BLD AUTO: 0.02 K/UL — SIGNIFICANT CHANGE UP (ref 0–0.2)
BASOPHILS # BLD AUTO: 0.03 K/UL — SIGNIFICANT CHANGE UP (ref 0–0.2)
BASOPHILS NFR BLD AUTO: 0.3 % — SIGNIFICANT CHANGE UP (ref 0–1)
BASOPHILS NFR BLD AUTO: 0.6 % — SIGNIFICANT CHANGE UP (ref 0–1)
BILIRUB SERPL-MCNC: 0.8 MG/DL — SIGNIFICANT CHANGE UP (ref 0.2–1.2)
BUN SERPL-MCNC: 10 MG/DL — SIGNIFICANT CHANGE UP (ref 10–20)
BUN SERPL-MCNC: 11 MG/DL — SIGNIFICANT CHANGE UP (ref 10–20)
CALCIUM SERPL-MCNC: 8.6 MG/DL — SIGNIFICANT CHANGE UP (ref 8.4–10.5)
CALCIUM SERPL-MCNC: 8.6 MG/DL — SIGNIFICANT CHANGE UP (ref 8.4–10.5)
CHLORIDE SERPL-SCNC: 100 MMOL/L — SIGNIFICANT CHANGE UP (ref 98–110)
CHLORIDE SERPL-SCNC: 104 MMOL/L — SIGNIFICANT CHANGE UP (ref 98–110)
CO2 SERPL-SCNC: 26 MMOL/L — SIGNIFICANT CHANGE UP (ref 17–32)
CO2 SERPL-SCNC: 29 MMOL/L — SIGNIFICANT CHANGE UP (ref 17–32)
CREAT SERPL-MCNC: 0.6 MG/DL — LOW (ref 0.7–1.5)
CREAT SERPL-MCNC: 0.7 MG/DL — SIGNIFICANT CHANGE UP (ref 0.7–1.5)
EGFR: 101 ML/MIN/1.73M2 — SIGNIFICANT CHANGE UP
EGFR: 98 ML/MIN/1.73M2 — SIGNIFICANT CHANGE UP
EOSINOPHIL # BLD AUTO: 0.07 K/UL — SIGNIFICANT CHANGE UP (ref 0–0.7)
EOSINOPHIL # BLD AUTO: 0.18 K/UL — SIGNIFICANT CHANGE UP (ref 0–0.7)
EOSINOPHIL NFR BLD AUTO: 1.5 % — SIGNIFICANT CHANGE UP (ref 0–8)
EOSINOPHIL NFR BLD AUTO: 2.8 % — SIGNIFICANT CHANGE UP (ref 0–8)
ESTIMATED AVERAGE GLUCOSE: 126 MG/DL — HIGH (ref 68–114)
GLUCOSE SERPL-MCNC: 140 MG/DL — HIGH (ref 70–99)
GLUCOSE SERPL-MCNC: 97 MG/DL — SIGNIFICANT CHANGE UP (ref 70–99)
HCT VFR BLD CALC: 30.9 % — LOW (ref 37–47)
HCT VFR BLD CALC: 32.7 % — LOW (ref 37–47)
HCV AB S/CO SERPL IA: 0.04 COI — SIGNIFICANT CHANGE UP
HCV AB SERPL-IMP: SIGNIFICANT CHANGE UP
HGB BLD-MCNC: 10.1 G/DL — LOW (ref 12–16)
HGB BLD-MCNC: 10.9 G/DL — LOW (ref 12–16)
IMM GRANULOCYTES NFR BLD AUTO: 0 % — LOW (ref 0.1–0.3)
IMM GRANULOCYTES NFR BLD AUTO: 0.3 % — SIGNIFICANT CHANGE UP (ref 0.1–0.3)
LYMPHOCYTES # BLD AUTO: 0.77 K/UL — LOW (ref 1.2–3.4)
LYMPHOCYTES # BLD AUTO: 1.79 K/UL — SIGNIFICANT CHANGE UP (ref 1.2–3.4)
LYMPHOCYTES # BLD AUTO: 12 % — LOW (ref 20.5–51.1)
LYMPHOCYTES # BLD AUTO: 37.8 % — SIGNIFICANT CHANGE UP (ref 20.5–51.1)
MAGNESIUM SERPL-MCNC: 1.8 MG/DL — SIGNIFICANT CHANGE UP (ref 1.8–2.4)
MAGNESIUM SERPL-MCNC: 2 MG/DL — SIGNIFICANT CHANGE UP (ref 1.8–2.4)
MCHC RBC-ENTMCNC: 29.4 PG — SIGNIFICANT CHANGE UP (ref 27–31)
MCHC RBC-ENTMCNC: 29.5 PG — SIGNIFICANT CHANGE UP (ref 27–31)
MCHC RBC-ENTMCNC: 32.7 G/DL — SIGNIFICANT CHANGE UP (ref 32–37)
MCHC RBC-ENTMCNC: 33.3 G/DL — SIGNIFICANT CHANGE UP (ref 32–37)
MCV RBC AUTO: 88.6 FL — SIGNIFICANT CHANGE UP (ref 81–99)
MCV RBC AUTO: 89.8 FL — SIGNIFICANT CHANGE UP (ref 81–99)
MONOCYTES # BLD AUTO: 0.18 K/UL — SIGNIFICANT CHANGE UP (ref 0.1–0.6)
MONOCYTES # BLD AUTO: 0.38 K/UL — SIGNIFICANT CHANGE UP (ref 0.1–0.6)
MONOCYTES NFR BLD AUTO: 2.8 % — SIGNIFICANT CHANGE UP (ref 1.7–9.3)
MONOCYTES NFR BLD AUTO: 8 % — SIGNIFICANT CHANGE UP (ref 1.7–9.3)
NEUTROPHILS # BLD AUTO: 2.47 K/UL — SIGNIFICANT CHANGE UP (ref 1.4–6.5)
NEUTROPHILS # BLD AUTO: 5.24 K/UL — SIGNIFICANT CHANGE UP (ref 1.4–6.5)
NEUTROPHILS NFR BLD AUTO: 52.1 % — SIGNIFICANT CHANGE UP (ref 42.2–75.2)
NEUTROPHILS NFR BLD AUTO: 81.8 % — HIGH (ref 42.2–75.2)
NRBC # BLD: 0 /100 WBCS — SIGNIFICANT CHANGE UP (ref 0–0)
NRBC # BLD: 0 /100 WBCS — SIGNIFICANT CHANGE UP (ref 0–0)
PHOSPHATE SERPL-MCNC: 3.7 MG/DL — SIGNIFICANT CHANGE UP (ref 2.1–4.9)
PHOSPHATE SERPL-MCNC: 4.5 MG/DL — SIGNIFICANT CHANGE UP (ref 2.1–4.9)
PLATELET # BLD AUTO: 162 K/UL — SIGNIFICANT CHANGE UP (ref 130–400)
PLATELET # BLD AUTO: 172 K/UL — SIGNIFICANT CHANGE UP (ref 130–400)
PMV BLD: 10.3 FL — SIGNIFICANT CHANGE UP (ref 7.4–10.4)
PMV BLD: 9.9 FL — SIGNIFICANT CHANGE UP (ref 7.4–10.4)
POTASSIUM SERPL-MCNC: 3.5 MMOL/L — SIGNIFICANT CHANGE UP (ref 3.5–5)
POTASSIUM SERPL-MCNC: 3.7 MMOL/L — SIGNIFICANT CHANGE UP (ref 3.5–5)
POTASSIUM SERPL-SCNC: 3.5 MMOL/L — SIGNIFICANT CHANGE UP (ref 3.5–5)
POTASSIUM SERPL-SCNC: 3.7 MMOL/L — SIGNIFICANT CHANGE UP (ref 3.5–5)
PROT SERPL-MCNC: 7 G/DL — SIGNIFICANT CHANGE UP (ref 6–8)
RBC # BLD: 3.44 M/UL — LOW (ref 4.2–5.4)
RBC # BLD: 3.69 M/UL — LOW (ref 4.2–5.4)
RBC # FLD: 13.3 % — SIGNIFICANT CHANGE UP (ref 11.5–14.5)
RBC # FLD: 13.4 % — SIGNIFICANT CHANGE UP (ref 11.5–14.5)
SODIUM SERPL-SCNC: 136 MMOL/L — SIGNIFICANT CHANGE UP (ref 135–146)
SODIUM SERPL-SCNC: 141 MMOL/L — SIGNIFICANT CHANGE UP (ref 135–146)
WBC # BLD: 4.74 K/UL — LOW (ref 4.8–10.8)
WBC # BLD: 6.41 K/UL — SIGNIFICANT CHANGE UP (ref 4.8–10.8)
WBC # FLD AUTO: 4.74 K/UL — LOW (ref 4.8–10.8)
WBC # FLD AUTO: 6.41 K/UL — SIGNIFICANT CHANGE UP (ref 4.8–10.8)

## 2024-03-18 PROCEDURE — 88342 IMHCHEM/IMCYTCHM 1ST ANTB: CPT | Mod: 26

## 2024-03-18 PROCEDURE — 58210 EXTENSIVE HYSTERECTOMY: CPT

## 2024-03-18 PROCEDURE — 88307 TISSUE EXAM BY PATHOLOGIST: CPT | Mod: 26

## 2024-03-18 PROCEDURE — 88341 IMHCHEM/IMCYTCHM EA ADD ANTB: CPT | Mod: 26

## 2024-03-18 PROCEDURE — 88305 TISSUE EXAM BY PATHOLOGIST: CPT | Mod: 26

## 2024-03-18 PROCEDURE — 88309 TISSUE EXAM BY PATHOLOGIST: CPT | Mod: 26

## 2024-03-18 RX ORDER — IBUPROFEN 200 MG
600 TABLET ORAL EVERY 6 HOURS
Refills: 0 | Status: DISCONTINUED | OUTPATIENT
Start: 2024-03-18 | End: 2024-03-18

## 2024-03-18 RX ORDER — KETOROLAC TROMETHAMINE 30 MG/ML
30 SYRINGE (ML) INJECTION EVERY 8 HOURS
Refills: 0 | Status: DISCONTINUED | OUTPATIENT
Start: 2024-03-18 | End: 2024-03-19

## 2024-03-18 RX ORDER — TRIAMTERENE/HYDROCHLOROTHIAZID 75 MG-50MG
1 TABLET ORAL DAILY
Refills: 0 | Status: DISCONTINUED | OUTPATIENT
Start: 2024-03-18 | End: 2024-03-20

## 2024-03-18 RX ORDER — HYDROMORPHONE HYDROCHLORIDE 2 MG/ML
1 INJECTION INTRAMUSCULAR; INTRAVENOUS; SUBCUTANEOUS
Refills: 0 | Status: DISCONTINUED | OUTPATIENT
Start: 2024-03-18 | End: 2024-03-18

## 2024-03-18 RX ORDER — SENNA PLUS 8.6 MG/1
2 TABLET ORAL AT BEDTIME
Refills: 0 | Status: DISCONTINUED | OUTPATIENT
Start: 2024-03-18 | End: 2024-03-20

## 2024-03-18 RX ORDER — ACETAMINOPHEN 500 MG
1000 TABLET ORAL ONCE
Refills: 0 | Status: COMPLETED | OUTPATIENT
Start: 2024-03-18 | End: 2024-03-18

## 2024-03-18 RX ORDER — SODIUM CHLORIDE 9 MG/ML
1000 INJECTION, SOLUTION INTRAVENOUS
Refills: 0 | Status: DISCONTINUED | OUTPATIENT
Start: 2024-03-18 | End: 2024-03-18

## 2024-03-18 RX ORDER — GABAPENTIN 400 MG/1
300 CAPSULE ORAL THREE TIMES A DAY
Refills: 0 | Status: DISCONTINUED | OUTPATIENT
Start: 2024-03-18 | End: 2024-03-20

## 2024-03-18 RX ORDER — IBUPROFEN 200 MG
600 TABLET ORAL EVERY 6 HOURS
Refills: 0 | Status: DISCONTINUED | OUTPATIENT
Start: 2024-03-18 | End: 2024-03-20

## 2024-03-18 RX ORDER — HYDROMORPHONE HYDROCHLORIDE 2 MG/ML
2 INJECTION INTRAMUSCULAR; INTRAVENOUS; SUBCUTANEOUS ONCE
Refills: 0 | Status: DISCONTINUED | OUTPATIENT
Start: 2024-03-18 | End: 2024-03-18

## 2024-03-18 RX ORDER — SIMETHICONE 80 MG/1
80 TABLET, CHEWABLE ORAL EVERY 6 HOURS
Refills: 0 | Status: DISCONTINUED | OUTPATIENT
Start: 2024-03-18 | End: 2024-03-20

## 2024-03-18 RX ORDER — SODIUM CHLORIDE 9 MG/ML
1000 INJECTION, SOLUTION INTRAVENOUS
Refills: 0 | Status: DISCONTINUED | OUTPATIENT
Start: 2024-03-18 | End: 2024-03-19

## 2024-03-18 RX ORDER — SODIUM CHLORIDE 0.65 %
1 AEROSOL, SPRAY (ML) NASAL
Refills: 0 | Status: DISCONTINUED | OUTPATIENT
Start: 2024-03-18 | End: 2024-03-20

## 2024-03-18 RX ORDER — ONDANSETRON 8 MG/1
4 TABLET, FILM COATED ORAL ONCE
Refills: 0 | Status: COMPLETED | OUTPATIENT
Start: 2024-03-18 | End: 2024-03-18

## 2024-03-18 RX ORDER — ACETAMINOPHEN 500 MG
975 TABLET ORAL EVERY 6 HOURS
Refills: 0 | Status: DISCONTINUED | OUTPATIENT
Start: 2024-03-18 | End: 2024-03-18

## 2024-03-18 RX ORDER — HYDROMORPHONE HYDROCHLORIDE 2 MG/ML
0.5 INJECTION INTRAMUSCULAR; INTRAVENOUS; SUBCUTANEOUS
Refills: 0 | Status: DISCONTINUED | OUTPATIENT
Start: 2024-03-18 | End: 2024-03-18

## 2024-03-18 RX ORDER — ENOXAPARIN SODIUM 100 MG/ML
40 INJECTION SUBCUTANEOUS EVERY 24 HOURS
Refills: 0 | Status: DISCONTINUED | OUTPATIENT
Start: 2024-03-18 | End: 2024-03-20

## 2024-03-18 RX ADMIN — SIMETHICONE 80 MILLIGRAM(S): 80 TABLET, CHEWABLE ORAL at 11:37

## 2024-03-18 RX ADMIN — ENOXAPARIN SODIUM 40 MILLIGRAM(S): 100 INJECTION SUBCUTANEOUS at 23:03

## 2024-03-18 RX ADMIN — Medication 600 MILLIGRAM(S): at 17:36

## 2024-03-18 RX ADMIN — HYDROMORPHONE HYDROCHLORIDE 1 MILLIGRAM(S): 2 INJECTION INTRAMUSCULAR; INTRAVENOUS; SUBCUTANEOUS at 12:58

## 2024-03-18 RX ADMIN — SODIUM CHLORIDE 100 MILLILITER(S): 9 INJECTION, SOLUTION INTRAVENOUS at 11:17

## 2024-03-18 RX ADMIN — ONDANSETRON 4 MILLIGRAM(S): 8 TABLET, FILM COATED ORAL at 16:40

## 2024-03-18 RX ADMIN — Medication 600 MILLIGRAM(S): at 12:06

## 2024-03-18 RX ADMIN — Medication 30 MILLIGRAM(S): at 23:03

## 2024-03-18 RX ADMIN — Medication 1 SPRAY(S): at 23:03

## 2024-03-18 RX ADMIN — HEPARIN SODIUM 5000 UNIT(S): 5000 INJECTION INTRAVENOUS; SUBCUTANEOUS at 08:32

## 2024-03-18 RX ADMIN — Medication 1000 MILLIGRAM(S): at 08:31

## 2024-03-18 RX ADMIN — Medication 600 MILLIGRAM(S): at 11:36

## 2024-03-18 RX ADMIN — SODIUM CHLORIDE 125 MILLILITER(S): 9 INJECTION, SOLUTION INTRAVENOUS at 00:03

## 2024-03-18 RX ADMIN — GABAPENTIN 300 MILLIGRAM(S): 400 CAPSULE ORAL at 15:54

## 2024-03-18 RX ADMIN — ONDANSETRON 4 MILLIGRAM(S): 8 TABLET, FILM COATED ORAL at 11:22

## 2024-03-18 RX ADMIN — Medication 1 TABLET(S): at 17:36

## 2024-03-18 RX ADMIN — HYDROMORPHONE HYDROCHLORIDE 2 MILLIGRAM(S): 2 INJECTION INTRAMUSCULAR; INTRAVENOUS; SUBCUTANEOUS at 18:49

## 2024-03-18 RX ADMIN — SIMETHICONE 80 MILLIGRAM(S): 80 TABLET, CHEWABLE ORAL at 17:37

## 2024-03-18 RX ADMIN — GABAPENTIN 300 MILLIGRAM(S): 400 CAPSULE ORAL at 23:02

## 2024-03-18 RX ADMIN — Medication 400 MILLIGRAM(S): at 13:50

## 2024-03-18 RX ADMIN — SIMETHICONE 80 MILLIGRAM(S): 80 TABLET, CHEWABLE ORAL at 23:03

## 2024-03-18 NOTE — BRIEF OPERATIVE NOTE - OPERATION/FINDINGS
Normal appearing cervix and vagina. Enlarged 16w size uterus with multiple fibroids. Upon excision of specimen tumor material was noted coming from the uterine cavity. Normal appearing tubes and ovaries bilaterally. Normal appearing pelvic lymph nodes bilaterally. Normal appearing omentum.

## 2024-03-18 NOTE — CHART NOTE - NSCHARTNOTEFT_GEN_A_CORE
PACU ANESTHESIA ADMISSION NOTE      Procedure: Hysterectomy, total, abdominal, with BSO    Pelvic lymphadenectomy for staging of neoplasm    Partial omentectomy      Post op diagnosis:  Endometrial carcinoma        ____  Intubated  TV:______       Rate: ______      FiO2: ______    __x__  Patent Airway    __x__  Full return of protective reflexes    __x__  Full recovery from anesthesia / back to baseline     Vitals:   T:   97        R:  14                BP:   153/72               Sat:   96%                P: 74      Mental Status:  __x__ Awake   __x___ Alert   _____ Drowsy   _____ Sedated    Nausea/Vomiting:  __x__ NO  ______Yes,   See Post - Op Orders          Pain Scale (0-10):  _____    Treatment: ____ None    __x__ See Post - Op/PCA Orders    Post - Operative Fluids:   ____ Oral   __x__ See Post - Op Orders    Plan: Discharge:   ____Home       ___x__Floor     _____Critical Care    _____  Other:_________________    Comments: patient hemodynamically stable. Handoff endorsed to PACU RN. No anesthesia related issues present. To be transferred back to floor when criteria met

## 2024-03-18 NOTE — BRIEF OPERATIVE NOTE - NSICDXBRIEFPROCEDURE_GEN_ALL_CORE_FT
PROCEDURES:  Hysterectomy, total, abdominal, with BSO 18-Mar-2024 10:41:30  Brissa Somers  Pelvic lymphadenectomy for staging of neoplasm 18-Mar-2024 10:41:50  Brissa Somers  Partial omentectomy 18-Mar-2024 10:41:59  Brissa Somers

## 2024-03-19 ENCOUNTER — TRANSCRIPTION ENCOUNTER (OUTPATIENT)
Age: 64
End: 2024-03-19

## 2024-03-19 LAB
ALBUMIN SERPL ELPH-MCNC: 3.5 G/DL — SIGNIFICANT CHANGE UP (ref 3.5–5.2)
ALP SERPL-CCNC: 75 U/L — SIGNIFICANT CHANGE UP (ref 30–115)
ALT FLD-CCNC: 94 U/L — HIGH (ref 0–41)
ANION GAP SERPL CALC-SCNC: 10 MMOL/L — SIGNIFICANT CHANGE UP (ref 7–14)
AST SERPL-CCNC: 64 U/L — HIGH (ref 0–41)
BASOPHILS # BLD AUTO: 0.01 K/UL — SIGNIFICANT CHANGE UP (ref 0–0.2)
BASOPHILS NFR BLD AUTO: 0.1 % — SIGNIFICANT CHANGE UP (ref 0–1)
BILIRUB SERPL-MCNC: 0.4 MG/DL — SIGNIFICANT CHANGE UP (ref 0.2–1.2)
BUN SERPL-MCNC: 10 MG/DL — SIGNIFICANT CHANGE UP (ref 10–20)
CALCIUM SERPL-MCNC: 8.8 MG/DL — SIGNIFICANT CHANGE UP (ref 8.4–10.5)
CHLORIDE SERPL-SCNC: 102 MMOL/L — SIGNIFICANT CHANGE UP (ref 98–110)
CO2 SERPL-SCNC: 25 MMOL/L — SIGNIFICANT CHANGE UP (ref 17–32)
CREAT SERPL-MCNC: 0.7 MG/DL — SIGNIFICANT CHANGE UP (ref 0.7–1.5)
EGFR: 98 ML/MIN/1.73M2 — SIGNIFICANT CHANGE UP
EOSINOPHIL # BLD AUTO: 0 K/UL — SIGNIFICANT CHANGE UP (ref 0–0.7)
EOSINOPHIL NFR BLD AUTO: 0 % — SIGNIFICANT CHANGE UP (ref 0–8)
GLUCOSE SERPL-MCNC: 125 MG/DL — HIGH (ref 70–99)
HCT VFR BLD CALC: 31.2 % — LOW (ref 37–47)
HGB BLD-MCNC: 10.3 G/DL — LOW (ref 12–16)
IMM GRANULOCYTES NFR BLD AUTO: 0.3 % — SIGNIFICANT CHANGE UP (ref 0.1–0.3)
LYMPHOCYTES # BLD AUTO: 1.23 K/UL — SIGNIFICANT CHANGE UP (ref 1.2–3.4)
LYMPHOCYTES # BLD AUTO: 16.8 % — LOW (ref 20.5–51.1)
MAGNESIUM SERPL-MCNC: 2 MG/DL — SIGNIFICANT CHANGE UP (ref 1.8–2.4)
MCHC RBC-ENTMCNC: 29.3 PG — SIGNIFICANT CHANGE UP (ref 27–31)
MCHC RBC-ENTMCNC: 33 G/DL — SIGNIFICANT CHANGE UP (ref 32–37)
MCV RBC AUTO: 88.9 FL — SIGNIFICANT CHANGE UP (ref 81–99)
MONOCYTES # BLD AUTO: 0.51 K/UL — SIGNIFICANT CHANGE UP (ref 0.1–0.6)
MONOCYTES NFR BLD AUTO: 7 % — SIGNIFICANT CHANGE UP (ref 1.7–9.3)
NEUTROPHILS # BLD AUTO: 5.54 K/UL — SIGNIFICANT CHANGE UP (ref 1.4–6.5)
NEUTROPHILS NFR BLD AUTO: 75.8 % — HIGH (ref 42.2–75.2)
NRBC # BLD: 0 /100 WBCS — SIGNIFICANT CHANGE UP (ref 0–0)
PHOSPHATE SERPL-MCNC: 4 MG/DL — SIGNIFICANT CHANGE UP (ref 2.1–4.9)
PLATELET # BLD AUTO: 164 K/UL — SIGNIFICANT CHANGE UP (ref 130–400)
PMV BLD: 10 FL — SIGNIFICANT CHANGE UP (ref 7.4–10.4)
POTASSIUM SERPL-MCNC: 3.9 MMOL/L — SIGNIFICANT CHANGE UP (ref 3.5–5)
POTASSIUM SERPL-SCNC: 3.9 MMOL/L — SIGNIFICANT CHANGE UP (ref 3.5–5)
PROT SERPL-MCNC: 6.7 G/DL — SIGNIFICANT CHANGE UP (ref 6–8)
RBC # BLD: 3.51 M/UL — LOW (ref 4.2–5.4)
RBC # FLD: 13.3 % — SIGNIFICANT CHANGE UP (ref 11.5–14.5)
SODIUM SERPL-SCNC: 137 MMOL/L — SIGNIFICANT CHANGE UP (ref 135–146)
WBC # BLD: 7.31 K/UL — SIGNIFICANT CHANGE UP (ref 4.8–10.8)
WBC # FLD AUTO: 7.31 K/UL — SIGNIFICANT CHANGE UP (ref 4.8–10.8)

## 2024-03-19 RX ADMIN — GABAPENTIN 300 MILLIGRAM(S): 400 CAPSULE ORAL at 05:54

## 2024-03-19 RX ADMIN — Medication 30 MILLIGRAM(S): at 23:32

## 2024-03-19 RX ADMIN — ENOXAPARIN SODIUM 40 MILLIGRAM(S): 100 INJECTION SUBCUTANEOUS at 23:31

## 2024-03-19 RX ADMIN — SIMETHICONE 80 MILLIGRAM(S): 80 TABLET, CHEWABLE ORAL at 11:39

## 2024-03-19 RX ADMIN — GABAPENTIN 300 MILLIGRAM(S): 400 CAPSULE ORAL at 14:08

## 2024-03-19 RX ADMIN — Medication 30 MILLIGRAM(S): at 14:41

## 2024-03-19 RX ADMIN — SIMETHICONE 80 MILLIGRAM(S): 80 TABLET, CHEWABLE ORAL at 23:32

## 2024-03-19 RX ADMIN — Medication 1 TABLET(S): at 05:53

## 2024-03-19 RX ADMIN — Medication 30 MILLIGRAM(S): at 06:51

## 2024-03-19 RX ADMIN — SIMETHICONE 80 MILLIGRAM(S): 80 TABLET, CHEWABLE ORAL at 05:53

## 2024-03-19 RX ADMIN — SIMETHICONE 80 MILLIGRAM(S): 80 TABLET, CHEWABLE ORAL at 17:06

## 2024-03-19 RX ADMIN — GABAPENTIN 300 MILLIGRAM(S): 400 CAPSULE ORAL at 23:31

## 2024-03-19 NOTE — DISCHARGE NOTE NURSING/CASE MANAGEMENT/SOCIAL WORK - PATIENT PORTAL LINK FT
You can access the FollowMyHealth Patient Portal offered by [facility] by registering at the following website: [email or web address]. By joining [facility]’s FollowMyHealth portal, you will also be able to view your health information using other applications (apps) compatible with our system.

## 2024-03-20 ENCOUNTER — TRANSCRIPTION ENCOUNTER (OUTPATIENT)
Age: 64
End: 2024-03-20

## 2024-03-20 VITALS
SYSTOLIC BLOOD PRESSURE: 128 MMHG | RESPIRATION RATE: 18 BRPM | OXYGEN SATURATION: 98 % | TEMPERATURE: 98 F | DIASTOLIC BLOOD PRESSURE: 70 MMHG | HEART RATE: 69 BPM

## 2024-03-20 RX ORDER — GABAPENTIN 400 MG/1
1 CAPSULE ORAL
Qty: 42 | Refills: 0
Start: 2024-03-20 | End: 2024-04-02

## 2024-03-20 RX ORDER — APIXABAN 2.5 MG/1
1 TABLET, FILM COATED ORAL
Qty: 28 | Refills: 0
Start: 2024-03-20 | End: 2024-04-02

## 2024-03-20 RX ORDER — SENNA PLUS 8.6 MG/1
2 TABLET ORAL
Qty: 14 | Refills: 0
Start: 2024-03-20 | End: 2024-03-26

## 2024-03-20 RX ORDER — TRIAMTERENE/HYDROCHLOROTHIAZID 75 MG-50MG
1 TABLET ORAL
Qty: 0 | Refills: 0 | DISCHARGE
Start: 2024-03-20

## 2024-03-20 RX ORDER — IBUPROFEN 200 MG
1 TABLET ORAL
Qty: 28 | Refills: 0
Start: 2024-03-20 | End: 2024-03-26

## 2024-03-20 RX ORDER — SIMETHICONE 80 MG/1
1 TABLET, CHEWABLE ORAL
Qty: 28 | Refills: 0
Start: 2024-03-20 | End: 2024-03-26

## 2024-03-20 RX ORDER — IBUPROFEN 200 MG
600 TABLET ORAL ONCE
Refills: 0 | Status: COMPLETED | OUTPATIENT
Start: 2024-03-20 | End: 2024-03-20

## 2024-03-20 RX ADMIN — SIMETHICONE 80 MILLIGRAM(S): 80 TABLET, CHEWABLE ORAL at 11:12

## 2024-03-20 RX ADMIN — Medication 600 MILLIGRAM(S): at 11:55

## 2024-03-20 RX ADMIN — Medication 1 TABLET(S): at 11:12

## 2024-03-20 RX ADMIN — GABAPENTIN 300 MILLIGRAM(S): 400 CAPSULE ORAL at 05:46

## 2024-03-20 RX ADMIN — SIMETHICONE 80 MILLIGRAM(S): 80 TABLET, CHEWABLE ORAL at 05:46

## 2024-03-20 NOTE — DISCHARGE NOTE PROVIDER - NSDCFUSCHEDAPPT_GEN_ALL_CORE_FT
[facility]ician [facility]  [facility] [address]  Scheduled Appointment: 04/02/2024

## 2024-03-20 NOTE — PROGRESS NOTE ADULT - SUBJECTIVE AND OBJECTIVE BOX
PGY4 Note:    Patient seen and examined. Pain well controlled at this time. No complaints at this time. Denies fever, chills, nausea, vomiting, chest pain, shortness of breath, severe abdominal pain, heavy vaginal bleeding. Is ambulating, tolerating PO, passing flatus.     Physical Exam:  Vital Signs:  T(C): 36.4 (03-20-24 @ 04:18), Max: 37 (03-19-24 @ 20:29)  HR: 64 (03-20-24 @ 04:18) (64 - 77)  BP: 100/56 (03-20-24 @ 04:18) (100/56 - 130/67)  RR: 18 (03-20-24 @ 04:18) (18 - 18)  SpO2: 98% (03-20-24 @ 04:18) (98% - 99%)    03-18-24 @ 07:01  -  03-19-24 @ 07:00  --------------------------------------------------------  IN: 0 mL / OUT: 2400 mL / NET: -2400 mL    03-19-24 @ 07:01  -  03-20-24 @ 06:58  --------------------------------------------------------  IN: 0 mL / OUT: 700 mL / NET: -700 mL      Gen: AOx3, NAD  Heart: RRR, S1S2, no m/r/g  Lungs: CTA b/l  Abd: soft, nontender, nondistended, no rebound, guarding, or rigidity, incision clean/dry/intact with staples in place  VE: Deferred, no active bleeding  Ext: SCDs, no edema or calf tenderness bilaterally    Labs:                        10.3   7.31  )-----------( 164      ( 19 Mar 2024 05:58 )             31.2       03-19    137  |  102  |  10  ----------------------------<  125<H>  3.9   |  25  |  0.7    Ca    8.8      19 Mar 2024 05:58  Phos  4.0     03-19  Mg     2.0     03-19    TPro  6.7  /  Alb  3.5  /  TBili  0.4  /  DBili  x   /  AST  64<H>  /  ALT  94<H>  /  AlkPhos  75  03-19        Urinalysis Basic - ( 19 Mar 2024 05:58 )    Color: x / Appearance: x / SG: x / pH: x  Gluc: 125 mg/dL / Ketone: x  / Bili: x / Urobili: x   Blood: x / Protein: x / Nitrite: x   Leuk Esterase: x / RBC: x / WBC x   Sq Epi: x / Non Sq Epi: x / Bacteria: x    Medications:  enoxaparin Injectable 40 milliGRAM(s) SubCutaneous every 24 hours  gabapentin 300 milliGRAM(s) Oral three times a day  ibuprofen  Tablet. 600 milliGRAM(s) Oral every 6 hours  senna 2 Tablet(s) Oral at bedtime PRN  simethicone 80 milliGRAM(s) Chew every 6 hours  sodium chloride 0.65% Nasal 1 Spray(s) Both Nostrils two times a day PRN  triamterene 37.5 mG/hydrochlorothiazide 25 mG Tablet 1 Tablet(s) Oral daily    
PGY4 Note:    Patient seen and examined. Pain well controlled at this time. No complaints at this time. Denies fever, chills, nausea, vomiting, chest pain, shortness of breath, severe abdominal pain, heavy vaginal bleeding. Tolerating PO. Not ambulating, passing flatus.     Physical Exam:  Vital Signs:  T(C): 36.6 (03-19-24 @ 04:25), Max: 36.8 (03-18-24 @ 20:29)  HR: 73 (03-19-24 @ 04:25) (48 - 94)  BP: 139/85 (03-19-24 @ 04:25) (124/62 - 164/86)  RR: 18 (03-19-24 @ 04:25) (11 - 23)  SpO2: 98% (03-19-24 @ 04:25) (92% - 100%)    03-18-24 @ 07:01  -  03-19-24 @ 06:43  --------------------------------------------------------  IN: 0 mL / OUT: 2400 mL / NET: -2400 mL      Gen: AOx3, NAD  Heart: RRR, S1S2, no m/r/g  Lungs: CTA b/l  Abd: soft, nontender, nondistended, no rebound, guarding, or rigidity, incision clean/dry/intact with staples in place  VE: Deferred, no active bleeding  Ext: SCDs, no edema or calf tenderness bilaterally    Labs:                        10.3   7.31  )-----------( 164      ( 19 Mar 2024 05:58 )             31.2     03-19    137  |  102  |  10  ----------------------------<  125<H>  3.9   |  25  |  0.7    Ca    8.8      19 Mar 2024 05:58  Phos  4.0     03-19  Mg     2.0     03-19    TPro  6.7  /  Alb  3.5  /  TBili  0.4  /  DBili  x   /  AST  64<H>  /  ALT  94<H>  /  AlkPhos  75  03-19        Urinalysis Basic - ( 19 Mar 2024 05:58 )    Color: x / Appearance: x / SG: x / pH: x  Gluc: 125 mg/dL / Ketone: x  / Bili: x / Urobili: x   Blood: x / Protein: x / Nitrite: x   Leuk Esterase: x / RBC: x / WBC x   Sq Epi: x / Non Sq Epi: x / Bacteria: x    Medications:  enoxaparin Injectable 40 milliGRAM(s) SubCutaneous every 24 hours  gabapentin 300 milliGRAM(s) Oral three times a day  ibuprofen  Tablet. 600 milliGRAM(s) Oral every 6 hours  ketorolac   Injectable 30 milliGRAM(s) IV Push every 8 hours  lactated ringers. 1000 milliLiter(s) IV Continuous <Continuous>  senna 2 Tablet(s) Oral at bedtime PRN  simethicone 80 milliGRAM(s) Chew every 6 hours  sodium chloride 0.65% Nasal 1 Spray(s) Both Nostrils two times a day PRN  triamterene 37.5 mG/hydrochlorothiazide 25 mG Tablet 1 Tablet(s) Oral daily    
  SINAHAYES  63y, Female  Allergy: penicillin (Unknown)  erythromycin (Nausea; Diarrhea)    Hospital Day:     Patient seen and examined. No acute events overnight    PMH/PSH:  PAST MEDICAL & SURGICAL HISTORY:  Asthma      Hypertension      Obesity      H/O sleep apnea      Fibroid uterus      S/P cholecystectomy      H/O  section          VITALS:  T(F): 97.9 (24 @ 10:16), Max: 98.2 (24 @ 19:36)  HR: 80 (24 @ 10:16)  BP: 132/68 (24 @ 10:16) (103/55 - 168/82)  RR: 18 (24 @ 10:16)  SpO2: 96% (24 @ 10:16)    TESTS & MEASUREMENTS:  Weight (Kg): 108.4 (24 @ 19:36)  BMI (kg/m2): 38.6 ()                          11.1   5.71  )-----------( 188      ( 17 Mar 2024 06:38 )             33.6     PT/INR - ( 17 Mar 2024 06:38 )   PT: 12.40 sec;   INR: 1.09 ratio         PTT - ( 17 Mar 2024 06:38 )  PTT:27.2 sec      139  |  102  |  8<L>  ----------------------------<  101<H>  3.6   |  26  |  0.6<L>    Ca    9.0      17 Mar 2024 06:38  Phos  4.1       Mg     2.2         TPro  7.2  /  Alb  3.9  /  TBili  0.4  /  DBili  x   /  AST  28  /  ALT  32  /  AlkPhos  55      LIVER FUNCTIONS - ( 17 Mar 2024 06:38 )  Alb: 3.9 g/dL / Pro: 7.2 g/dL / ALK PHOS: 55 U/L / ALT: 32 U/L / AST: 28 U/L / GGT: x                 Urinalysis Basic - ( 17 Mar 2024 06:38 )    Color: x / Appearance: x / SG: x / pH: x  Gluc: 101 mg/dL / Ketone: x  / Bili: x / Urobili: x   Blood: x / Protein: x / Nitrite: x   Leuk Esterase: x / RBC: x / WBC x   Sq Epi: x / Non Sq Epi: x / Bacteria: x        RADIOLOGY & ADDITIONAL TESTS:    RECENT DIAGNOSTIC ORDERS:  Phosphorus: AM Sched. Collection: 18-Mar-2024 04:30 (24 @ 10:02)  Magnesium: AM Sched. Collection: 18-Mar-2024 04:30 (24 @ 10:02)  Basic Metabolic Panel: AM Sched. Collection: 18-Mar-2024 04:30 (24 @ 10:02)  Complete Blood Count + Automated Diff: AM Sched. Collection: 18-Mar-2024 04:30 (24 @ 10:02)  Diet, NPO after Midnight:      NPO Start Date: 17-Mar-2024,   NPO Start Time: 23:59 (24 @ 04:27)  Diet, Regular (24 @ 03:49)  12 Lead ECG (24 @ 00:17)  A1C with Estimated Average Glucose: AM Sched. Collection: 17-Mar-2024 04:30 (24 @ 00:17)  Hepatitis C Antibody Screen: AM Sched. Collection: 17-Mar-2024 04:30 (24 @ 00:15)  ABO Rh Confirmatory Specimen: STAT  Addl Info: Conditional: ABO Rh Confirmatory Specimen (24 @ 21:06)      MEDICATIONS:  MEDICATIONS  (STANDING):  lactated ringers. 1000 milliLiter(s) (125 mL/Hr) IV Continuous <Continuous>  triamterene 37.5 mG/hydrochlorothiazide 25 mG Tablet 1 Tablet(s) Oral daily    MEDICATIONS  (PRN):  acetaminophen     Tablet .. 975 milliGRAM(s) Oral every 6 hours PRN Mild Pain (1 - 3), Moderate Pain (4 - 6), Severe Pain (7 - 10)  ibuprofen  Tablet. 600 milliGRAM(s) Oral every 6 hours PRN Mild Pain (1 - 3), Moderate Pain (4 - 6), Severe Pain (7 - 10)      HOME MEDICATIONS:      REVIEW OF SYSTEMS:  All other review of systems is negative unless indicated above.     PHYSICAL EXAM:  PHYSICAL EXAM:  GENERAL: NAD  HEAD:  Atraumatic, Normocephalic  NECK: Supple, No JVD  CHEST/LUNG: Clear to auscultation bilaterally; No wheeze  HEART: Regular rate and rhythm; No murmurs, rubs, or gallops  ABDOMEN: Soft, Nontender, Nondistended; Bowel sounds present  EXTREMITIES:  2+ Peripheral Pulses, No clubbing, cyanosis, or edema  SKIN: No rashes or lesions      
PGY 2 Note    Pt seen and examined at bedside. Sleeping comfortably. Reports mild vaginal bleeding overnight. Denies fever, chills, N/V, CP, SOB, diarrhea, constipation.     ambulating: yes  voiding: yes  tolerating diet: NPO/IVF for procedure  flatus: yes    T(F): 97.2 (03-18-24 @ 04:08), Max: 98.6 (03-17-24 @ 20:50)  HR: 57 (03-18-24 @ 04:08) (56 - 80)  BP: 105/55 (03-18-24 @ 04:08) (105/55 - 154/76)  RR: 18 (03-18-24 @ 04:08) (18 - 18)  SpO2: 98% (03-18-24 @ 04:08) (96% - 98%)  Wt(kg): --  I&O's Summary      MEDICATIONS  (STANDING):  acetaminophen     Tablet .. 1000 milliGRAM(s) Oral once  heparin   Injectable 5000 Unit(s) SubCutaneous every 8 hours  lactated ringers. 1000 milliLiter(s) (125 mL/Hr) IV Continuous <Continuous>    MEDICATIONS  (PRN):  acetaminophen     Tablet .. 975 milliGRAM(s) Oral every 6 hours PRN Mild Pain (1 - 3), Moderate Pain (4 - 6), Severe Pain (7 - 10)  ibuprofen  Tablet. 600 milliGRAM(s) Oral every 6 hours PRN Mild Pain (1 - 3), Moderate Pain (4 - 6), Severe Pain (7 - 10)      Physical Exam:  Constitutional: NAD  Pulmonary: clear to auscultation bilaterally   Cardiovascular: Regular rate and rhythm   Abdomen: soft, NT, nondistended  Extremities: no lower extremity edema or calve tenderness. SCDs in place     LABS:                        11.1   5.71  )-----------( 188      ( 17 Mar 2024 06:38 )             33.6     03-17    139  |  102  |  8<L>  ----------------------------<  101<H>  3.6   |  26  |  0.6<L>    Ca    9.0      17 Mar 2024 06:38  Phos  4.1     03-17  Mg     2.2     03-17    TPro  7.2  /  Alb  3.9  /  TBili  0.4  /  DBili  x   /  AST  28  /  ALT  32  /  AlkPhos  55  03-17    PT/INR - ( 17 Mar 2024 06:38 )   PT: 12.40 sec;   INR: 1.09 ratio         PTT - ( 17 Mar 2024 06:38 )  PTT:27.2 sec  Urinalysis Basic - ( 17 Mar 2024 06:38 )    Color: x / Appearance: x / SG: x / pH: x  Gluc: 101 mg/dL / Ketone: x  / Bili: x / Urobili: x   Blood: x / Protein: x / Nitrite: x   Leuk Esterase: x / RBC: x / WBC x   Sq Epi: x / Non Sq Epi: x / Bacteria: x    RADIOLOGY & ADDITIONAL TESTS:  < from: CT Abdomen and Pelvis w/ Oral Cont and w/ IV Cont (03.17.24 @ 03:28) >      INTERPRETATION:  CLINICAL STATEMENT: Vaginal bleeding    TECHNIQUE: Contiguous axial CT images were obtained from the lower chest   to the pubic symphysis Following the administration of 100 cc   administered of Omnipaque 350 (0 cc discarded).  Oral contrast was not   administered.  Reformatted images in the coronal and sagittal planes were   acquired.    COMPARISON: None.    FINDINGS:    LOWER CHEST: Unremarkable.    HEPATOBILIARY: .Hepatic steatosis. Postcholecystectomy    SPLEEN: 1.2 cm splenic hypodensity, nonspecific.    PANCREAS: Unremarkable.    ADRENAL GLANDS: Unremarkable.    KIDNEYS: Symmetric renal enhancement. No hydronephrosis. Mild right renal   pelvic fullness. Questionable left mid interpolar lesion.    ABDOMINOPELVIC NODES: Prominent left para-aortic 1.1 cm lymph node   (4-146). Portacaval prominent 1.1 cm lymph node (4-128). Perigastric 1.1   cm lymphnode (4-115).    PELVIC ORGANS: Lobulated contour of the uterus with likely fibroids.   Marked distention of the uterine canal to the level of the cervix with   high density material, possibly blood products. Difficult to distinguish   endometrium from the intracanal contents. The uterus exhibits mass effect   on the posterior bladder.    PERITONEUM/MESENTERY/BOWEL: No bowel obstruction, pneumoperitoneum or   ascites. Normal appendix. Colonic diverticulosis.    BONES/SOFT TISSUES: Degenerative changes of the spine.    IMPRESSION:    1. The uterine canal is lobulated and markedly distended, possibly with   blood products. Multiple bulky fibroids. Difficult to distinguish   endometrial layer from intracanal contents. Findings are concerning fora   neoplastic process. Gynecological consultation and tissue sampling   recommended.    2. Nonspecific prominent lymph nodes, catalogued above.    3. Questionable left mid interpolar renal lesion versus loculated normal   renal parenchyma. Can correlate with with outpatient MRI with intravenous   contrast for further evaluation.    --- End of Report ---    < end of copied text >  
PGY4 Note:    Patient seen and examined. Pain moderately controlled at this time. She reports some cramping discomfort and nausea. Denies fever, chills, vomiting, chest pain, shortness of breath, severe abdominal pain, heavy vaginal bleeding. Tolerating small sips of water. Not yet ambulating, voiding or passing flatus. Leroy in place draining clear urine.     Physical Exam:  Vital Signs:  T(C): 36.6 (03-18-24 @ 14:32), Max: 37 (03-17-24 @ 20:50)  HR: 80 (03-18-24 @ 14:32) (50 - 80)  BP: 158/86 (03-18-24 @ 14:32) (105/55 - 164/81)  RR: 18 (03-18-24 @ 14:32) (11 - 23)  SpO2: 97% (03-18-24 @ 14:32) (92% - 98%)    03-18-24 @ 07:01  -  03-18-24 @ 15:59  --------------------------------------------------------  IN: 0 mL / OUT: 400 mL / NET: -400 mL    Gen: AOx3, NAD  Heart: RRR, S1S2, no m/r/g  Lungs: CTA b/l  Abd: soft, nontender, nondistended, no rebound, guarding, or rigidity, vertical midline incision with dressing in place, clean/dry/intact   VE: Deferred, no active bleeding  Ext: SCDs, no edema or calf tenderness bilaterally  UO ([id number]) 400cc, ([id number]) 250cc    Labs:                        10.9   6.41  )-----------( 172      ( 18 Mar 2024 13:56 )             32.7       03-18    136  |  100  |  10  ----------------------------<  140<H>  3.7   |  26  |  0.7    Ca    8.6      18 Mar 2024 13:56  Phos  3.7     03-18  Mg     1.8     03-18    TPro  7.0  /  Alb  3.8  /  TBili  0.8  /  DBili  x   /  AST  179<H>  /  ALT  137<H>  /  AlkPhos  91  03-18        Urinalysis Basic - ( 18 Mar 2024 13:56 )    Color: x / Appearance: x / SG: x / pH: x  Gluc: 140 mg/dL / Ketone: x  / Bili: x / Urobili: x   Blood: x / Protein: x / Nitrite: x   Leuk Esterase: x / RBC: x / WBC x   Sq Epi: x / Non Sq Epi: x / Bacteria: x    PT/INR - ( 17 Mar 2024 06:38 )   PT: 12.40 sec;   INR: 1.09 ratio    PTT - ( 17 Mar 2024 06:38 )  PTT:27.2 sec    Medications:  enoxaparin Injectable 40 milliGRAM(s) SubCutaneous every 24 hours  gabapentin 300 milliGRAM(s) Oral three times a day  ibuprofen  Tablet. 600 milliGRAM(s) Oral every 6 hours  lactated ringers. 1000 milliLiter(s) IV Continuous <Continuous>  senna 2 Tablet(s) Oral at bedtime PRN  simethicone 80 milliGRAM(s) Chew every 6 hours  triamterene 37.5 mG/hydrochlorothiazide 25 mG Tablet 1 Tablet(s) Oral daily

## 2024-03-20 NOTE — DISCHARGE NOTE PROVIDER - CARE PROVIDER_API CALL
Antonio De Oliveira.  Gynecologic Oncology  [address] [place] [address], [address]  [place] [address]  Phone: [phone]  Fax: [phone]  Established Patient  Follow Up Time: 1 week

## 2024-03-20 NOTE — DISCHARGE NOTE PROVIDER - NSDCFUADDINST_GEN_ALL_CORE_FT
Nothing in the vagina for 6 weeks (no sex, no tampons, no douching, no swimming pools). Avoid tub baths, may shower. If you have a fever of 100.4F or greater, severe vaginal bleeding, or severe abdominal pain, call your OBGYN or come to the emergency department immediately.    Please  your medications from the pharmacy and take as directed

## 2024-03-20 NOTE — DISCHARGE NOTE PROVIDER - NSDCMRMEDTOKEN_GEN_ALL_CORE_FT
Eliquis 2.5 mg oral tablet: 1 tab(s) orally 2 times a day DVT prevention  gabapentin 300 mg oral capsule: 1 cap(s) orally 3 times a day as needed for pain  ibuprofen 600 mg oral tablet: 1 tab(s) orally every 6 hours as needed for pain  senna leaf extract oral tablet: 2 tab(s) orally once a day (at bedtime) as needed for Constipation  simethicone 80 mg oral tablet, chewable: 1 tab(s) orally every 6 hours  triamterene-hydrochlorothiazide 37.5 mg-25 mg oral tablet: 1 tab(s) orally once a day

## 2024-03-20 NOTE — PROGRESS NOTE ADULT - ASSESSMENT
A/P: 64yo P2  postmenopausal since 53yo, HOD#2, with PMHx HTN, prediabetes, obesity, sleep apnea, fibroid uterus, with postmenopausal bleeding r/o malignancy    #Postmenopausal bleeding possibly due to fibroids, r/o malignancy  -Scheduled for add-on ex-lap JIMI-BSO, possible staging today (3/18) in main OR  -medicine consult: Per ACC guidelines, pt would be low risk for moderate risk procedure. Further cardiac workup is not indicated  -DVT ppx: 5k heparin pre-op, SCDs  -NPO/IVF  -f/u AM labs, Hep C, A1C    #HTN  -home meds: triamterene/hctz 25mg qD  -vitals q4hr    #Sleep Apnea  -CPAP qhs (ordered)    d/w Dr. Somers and Dr. De Oliveira
64yo P2 postmenopausal, h/o HTN, s/p JIMI-BSO, omentectomy and pelvic lymphadenectomy, frozen: high grade carcinoma, with new onset transaminitis, POD#0, recovering well.    -monitor vitals q4h  -rivera in place, f/u UO  -regular diet  -ambulate as tolerated  -tylenol d/c given transaminitis  -pain management: ibuprofen q6h, gabapentin 300mg TID, senna and simethicone 80mg q6h  -IV fluids 100cc/hr  -AM labs ordered    Discussed with Dr. De Oliveira
64yo P2 postmenopausal, h/o HTN, s/p JIMI-BSO, omentectomy and pelvic lymphadenectomy, frozen: high grade carcinoma, with new onset transaminitis, POD#1, recovering well.    -monitor vitals q4h  -d/c rivera --> f/u TOV  -regular diet  -ambulate as tolerated  -tylenol d/c given transaminitis, f/u AM labs  -pain management: ibuprofen q6h, gabapentin 300mg TID, senna and simethicone 80mg q6h  -IV fluids 100cc/hr    Discussed with Dr. De Oliveira
62yo P2 postmenopausal, h/o HTN, s/p JIMI-BSO, omentectomy and pelvic lymphadenectomy, frozen: high grade carcinoma, with downtrending transaminitis, POD#2, recovering well.    -monitor vitals q4h  -regular diet  -ambulate as tolerated  -pain management: ibuprofen q6h, gabapentin 300mg TID, senna and simethicone 80mg q6h  -will transition to Eliquis  -anticipate d/c home    Discussed with Dr. De Oliveira
64yo P2   postmenopausal since 55yo, with PMHx HTN, prediabetes, obesity, sleep apnea, fibroid uterus presents with postmenopausal bleeding    #Postmenopausal bleeding possibly due to fibroids, r/o malignancy  CT and Pelvic US noted  - Scheduled for hysterectomy tomorrow 03/18  - Per ACC guidelines, pt would be low risk for moderate risk procedure. Further cardiac workup is not indicated  - Adequate pain control post-procedure was pts main concern. Pain management eval PRN    #HTN  - Cont triamterene/hctz 25mg qD    #Sleep Apnea  - CPAP qhs (ordered)    #Pre-dm  - Monitor FS  - outpt f/u    DVT PPX, per primary team

## 2024-03-20 NOTE — PROGRESS NOTE ADULT - REASON FOR ADMISSION
postmenopausal bleeding

## 2024-03-26 DIAGNOSIS — C54.1 MALIGNANT NEOPLASM OF ENDOMETRIUM: ICD-10-CM

## 2024-03-26 DIAGNOSIS — J45.909 UNSPECIFIED ASTHMA, UNCOMPLICATED: ICD-10-CM

## 2024-03-26 DIAGNOSIS — N95.0 POSTMENOPAUSAL BLEEDING: ICD-10-CM

## 2024-03-26 DIAGNOSIS — E66.9 OBESITY, UNSPECIFIED: ICD-10-CM

## 2024-03-26 DIAGNOSIS — D25.2 SUBSEROSAL LEIOMYOMA OF UTERUS: ICD-10-CM

## 2024-03-26 DIAGNOSIS — D25.1 INTRAMURAL LEIOMYOMA OF UTERUS: ICD-10-CM

## 2024-03-26 DIAGNOSIS — G47.30 SLEEP APNEA, UNSPECIFIED: ICD-10-CM

## 2024-03-26 DIAGNOSIS — Z90.49 ACQUIRED ABSENCE OF OTHER SPECIFIED PARTS OF DIGESTIVE TRACT: ICD-10-CM

## 2024-03-26 DIAGNOSIS — I10 ESSENTIAL (PRIMARY) HYPERTENSION: ICD-10-CM

## 2024-03-26 DIAGNOSIS — Z88.0 ALLERGY STATUS TO PENICILLIN: ICD-10-CM

## 2024-03-26 DIAGNOSIS — R74.01 ELEVATION OF LEVELS OF LIVER TRANSAMINASE LEVELS: ICD-10-CM

## 2024-03-26 DIAGNOSIS — Z88.1 ALLERGY STATUS TO OTHER ANTIBIOTIC AGENTS STATUS: ICD-10-CM

## 2024-03-26 DIAGNOSIS — R73.03 PREDIABETES: ICD-10-CM

## 2024-03-28 ENCOUNTER — APPOINTMENT (OUTPATIENT)
Dept: GYNECOLOGIC ONCOLOGY | Facility: CLINIC | Age: 64
End: 2024-03-28
Payer: MEDICAID

## 2024-03-28 PROBLEM — E66.9 OBESITY, UNSPECIFIED: Chronic | Status: ACTIVE | Noted: 2024-03-17

## 2024-03-28 PROCEDURE — 99024 POSTOP FOLLOW-UP VISIT: CPT

## 2024-04-04 PROBLEM — D25.9 LEIOMYOMA OF UTERUS, UNSPECIFIED: Chronic | Status: ACTIVE | Noted: 2024-03-17

## 2024-04-04 PROBLEM — Z86.69 PERSONAL HISTORY OF OTHER DISEASES OF THE NERVOUS SYSTEM AND SENSE ORGANS: Chronic | Status: ACTIVE | Noted: 2024-03-17

## 2024-04-15 ENCOUNTER — APPOINTMENT (OUTPATIENT)
Dept: GYNECOLOGIC ONCOLOGY | Facility: CLINIC | Age: 64
End: 2024-04-15
Payer: MEDICAID

## 2024-04-15 ENCOUNTER — NON-APPOINTMENT (OUTPATIENT)
Age: 64
End: 2024-04-15

## 2024-04-15 VITALS
BODY MASS INDEX: 38.41 KG/M2 | DIASTOLIC BLOOD PRESSURE: 84 MMHG | SYSTOLIC BLOOD PRESSURE: 132 MMHG | OXYGEN SATURATION: 98 % | HEART RATE: 83 BPM | WEIGHT: 239 LBS | TEMPERATURE: 98 F | HEIGHT: 66 IN

## 2024-04-15 PROCEDURE — 99204 OFFICE O/P NEW MOD 45 MIN: CPT

## 2024-04-16 LAB
ALBUMIN SERPL ELPH-MCNC: 4.3 G/DL
ALP BLD-CCNC: 72 U/L
ALT SERPL-CCNC: 38 U/L
ANION GAP SERPL CALC-SCNC: 21 MMOL/L
AST SERPL-CCNC: 37 U/L
BASOPHILS # BLD AUTO: 0.04 K/UL
BASOPHILS NFR BLD AUTO: 1.1 %
BILIRUB SERPL-MCNC: 0.2 MG/DL
BUN SERPL-MCNC: 18 MG/DL
CALCIUM SERPL-MCNC: 9.8 MG/DL
CHLORIDE SERPL-SCNC: 100 MMOL/L
CO2 SERPL-SCNC: 19 MMOL/L
CREAT SERPL-MCNC: 0.67 MG/DL
EGFR: 99 ML/MIN/1.73M2
EOSINOPHIL # BLD AUTO: 0.05 K/UL
EOSINOPHIL NFR BLD AUTO: 1.3 %
GLUCOSE SERPL-MCNC: 102 MG/DL
HCT VFR BLD CALC: 36.1 %
HGB BLD-MCNC: 11.1 G/DL
IMM GRANULOCYTES NFR BLD AUTO: 0.3 %
LYMPHOCYTES # BLD AUTO: 1.67 K/UL
LYMPHOCYTES NFR BLD AUTO: 43.9 %
MAGNESIUM SERPL-MCNC: 1.9 MG/DL
MAN DIFF?: NORMAL
MCHC RBC-ENTMCNC: 28 PG
MCHC RBC-ENTMCNC: 30.7 GM/DL
MCV RBC AUTO: 91.2 FL
MONOCYTES # BLD AUTO: 0.32 K/UL
MONOCYTES NFR BLD AUTO: 8.4 %
NEUTROPHILS # BLD AUTO: 1.71 K/UL
NEUTROPHILS NFR BLD AUTO: 45 %
PLATELET # BLD AUTO: 201 K/UL
POTASSIUM SERPL-SCNC: 3.8 MMOL/L
PROT SERPL-MCNC: 8.1 G/DL
RBC # BLD: 3.96 M/UL
RBC # FLD: 13.6 %
SODIUM SERPL-SCNC: 140 MMOL/L
WBC # FLD AUTO: 3.8 K/UL

## 2024-04-17 ENCOUNTER — NON-APPOINTMENT (OUTPATIENT)
Age: 64
End: 2024-04-17

## 2024-04-18 ENCOUNTER — OUTPATIENT (OUTPATIENT)
Dept: OUTPATIENT SERVICES | Facility: HOSPITAL | Age: 64
LOS: 1 days | End: 2024-04-18

## 2024-04-18 ENCOUNTER — APPOINTMENT (OUTPATIENT)
Dept: CT IMAGING | Facility: CLINIC | Age: 64
End: 2024-04-18
Payer: MEDICAID

## 2024-04-18 DIAGNOSIS — Z98.891 HISTORY OF UTERINE SCAR FROM PREVIOUS SURGERY: Chronic | ICD-10-CM

## 2024-04-18 DIAGNOSIS — Z90.49 ACQUIRED ABSENCE OF OTHER SPECIFIED PARTS OF DIGESTIVE TRACT: Chronic | ICD-10-CM

## 2024-04-18 PROCEDURE — 71260 CT THORAX DX C+: CPT | Mod: 26

## 2024-04-21 NOTE — HISTORY OF PRESENT ILLNESS
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 The uterine canal is lobulated and markedly distended, possibly with blood products. Multiple bulky fibroids. Difficult to distinguish endometrial layer from intracanal contents. Findings are concerning for a neoplastic process. Gynecological consultation and tissue sampling recommended. 2. Nonspecific prominent lymph nodes, catalogued above. 3. Questionable left mid interpolar renal lesion versus loculated normal renal parenchyma. Can correlate with with outpatient MRI with intravenous contrast for further evaluation.  1) Hysterectomy, BSO, bilateral SLN, omentectomy 3/18/24 with Dr. De Oliveira     1. Uterus, cervix, bilateral tubes and ovaries and endometrial mass, resection: - Carcinosarcoma, 9 cm in greatest dimension, exophytic and friable detached mass with focal residual carcinoma component limited to the endometrium pT1a pN0 (please see the comment and synoptic report below) - No myometrial invasion seen - All resection margins are negative for tumor - No lymphovascular invasion seen - Multiple leiomyoms, intramural and subserosal - Bilateral tubes and ovaries sections with no significant pathologic findings - Cervix sections with no significant pathologic findings  2. Left pelvic lymph node, excision: - One lymph node, negative for metastatic tumor (0/1)  3. Right pelvic lymph node, excision: - Six lymph node, negative for metastatic tumor (0/6)  4. Omentum, excision: - Fibroadipose tissue with reactive changes and focal chronic inflammation, negative for metastatic tumor  62 yo s/p staging surgery for 1A uterine carcinosarcoma with Dr. De Oliveira presenting for consultation for adjuvant treatment. Feeling well. She had postmenopausal spotting 3 years ago, her GYN performed EMB which showed insufficient tissue. She was closely monitored and the bleeding stopped. She then started feeling leakage of urine, w/ tinge of blood in 2/2024, which led her to go to urgent care who treated her for a UTI. Her PCP told her to see a GYN, who referred her for pelvic ultrasound. She ultimately saw Dr. Sharp who referred her to Dr. De Oliveira. She started having heavy vaginal bleeding 3/2024 so she went to the ED. She was admitted, and Dr. De Oliveira performed total abdominal hysterectomy, BSO, bilateral SLN, omentectomy on 3/18/24 (frozen section sent showing high grade carcinoma). He recommended adjuvant chemotherapy, and recommended referral to Dr. Hartman.  OBhx: CSx2, SAB Gynhx: Remote hx of abnormal pap smear (normal since then), hx fibroids. Menopause since age 54y.  PMH: asthma as a child, HTN, pre-diabetes, vertigo (saw neurologist and ENT), TAISHA PSH: CSx2, open cholecystectomy 1988, JIMI, BSO, bilateral SLN, omentectomy on 3/18/24 meds: triemterine 37.5 qd, meclizine prn, CPAP, nasal spray prn social: denies Fhx: Father w/ prostate ca (dx at 70's yo), 2 maternal aunts w/ breast ca (dx in their 50's), first cousin (maternal) w/ breast cancer (dx 39 yo). No genetic testing performed. Cousin w/ sarcoidosis  Last pap smear: last pap smear 2023 (normal) mammogram: 2023 wnl colonoscopy: last c-scope 10 years ago (wnl) cologuard 2022 wnl

## 2024-04-21 NOTE — DISCUSSION/SUMMARY
[Reviewed Clinical Lab Test(s)] : Results of clinical tests were reviewed. [Discuss Tests w/Referring Providers] : Results of labs/radiology studies and the treatment recommendations were discussed with performing/referring physician. [Discuss Alternatives/Risks/Benefits w/Patient] : All alternatives, risks, and benefits were discussed with the patient/family and all questions were answered.  Patient expressed good understanding and appreciates the importance of follow up as recommended. [Diagnosis/Stage ___] : Given this data, a diagnosis of [unfilled] is rendered. [FreeTextEntry1] : 62 yo s/p staging surgery for 1A uterine carcinosarcoma with Dr. De Oliveira presenting for consultation for adjuvant treatment.  NCCN guidelines for stage I carcinosarcoma discussed in detail. Carcinosarcomas are metaplastic carcinomas and not truly sarcomas, and are therefore included in the high-risk malignant epithelial tumors section of the NCCN guidelines. Multimodality treatment is typically recommended for these histologically aggressive tumors.   For patients with stage IA disease, with myometrial invasion, chemotherapy with or without RT is the preferred treatment option. Adjuvant platinum/taxane based therapy appears to improve survival, and recent phase II data suggests that it is non-inferior to the traditionally prescribed ifosfamide/paclitaxel, with a response rate of 54%.  [] referral given for CT chest [] referral for genetic testing [] labs drawn today [] slide review [] plan to discuss case at Tumor board on 4/17 [] RTC one week

## 2024-04-21 NOTE — PHYSICAL EXAM
[Absent] : Adnexa(ae): Absent [Normal] : Palpation of skin and subcutaneous tissue: Normal turgor [de-identified] : large midline vertical [de-identified] : vaginal cuff healing well [Fully active, able to carry on all pre-disease performance without restriction] : Status 0 - Fully active, able to carry on all pre-disease performance without restriction

## 2024-04-24 ENCOUNTER — APPOINTMENT (OUTPATIENT)
Dept: GYNECOLOGIC ONCOLOGY | Facility: CLINIC | Age: 64
End: 2024-04-24
Payer: MEDICAID

## 2024-04-24 VITALS
HEART RATE: 79 BPM | OXYGEN SATURATION: 100 % | TEMPERATURE: 98 F | HEIGHT: 66 IN | SYSTOLIC BLOOD PRESSURE: 119 MMHG | DIASTOLIC BLOOD PRESSURE: 82 MMHG | WEIGHT: 239 LBS | BODY MASS INDEX: 38.41 KG/M2

## 2024-04-24 PROCEDURE — 99459 PELVIC EXAMINATION: CPT

## 2024-04-24 PROCEDURE — 99214 OFFICE O/P EST MOD 30 MIN: CPT

## 2024-04-24 NOTE — HISTORY OF PRESENT ILLNESS
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 The uterine canal is lobulated and markedly distended, possibly with blood products. Multiple bulky fibroids. Difficult to distinguish endometrial layer from intracanal contents. Findings are concerning for a neoplastic process. Gynecological consultation and tissue sampling recommended. 2. Nonspecific prominent lymph nodes, catalogued above. 3. Questionable left mid interpolar renal lesion versus loculated normal renal parenchyma. Can correlate with with outpatient MRI with intravenous contrast for further evaluation.  1) Hysterectomy, BSO, bilateral SLN, omentectomy 3/18/24 with Dr. De Oliveira     1. Uterus, cervix, bilateral tubes and ovaries and endometrial mass, resection: - Carcinosarcoma, 9 cm in greatest dimension, exophytic and friable detached mass with focal residual carcinoma component limited to the endometrium pT1a pN0 (please see the comment and synoptic report below) - No myometrial invasion seen - All resection margins are negative for tumor - No lymphovascular invasion seen - Multiple leiomyoms, intramural and subserosal - Bilateral tubes and ovaries sections with no significant pathologic findings - Cervix sections with no significant pathologic findings  2. Left pelvic lymph node, excision: - One lymph node, negative for metastatic tumor (0/1)  3. Right pelvic lymph node, excision: - Six lymph node, negative for metastatic tumor (0/6)  4. Omentum, excision: - Fibroadipose tissue with reactive changes and focal chronic inflammation, negative for metastatic tumor 6) Chest CT 4/18/24 wnl   Here for follow up. Tumor board recommendation was for T/C x 6 with vaginal brachytherapy. Patient seeing genetics 4/29. Recently has been feeling fatigue since her surgery which has been attributing to her recovery from surgery. She has taken iron supplements which she thinks is helping.   OBhx: CSx2, SAB Gynhx: Remote hx of abnormal pap smear (normal since then), hx fibroids. Menopause since age 54y.  PMH: asthma as a child, HTN, pre-diabetes, vertigo (saw neurologist and ENT), TAISHA PSH: CSx2, open cholecystectomy 1988, JIMI, BSO, bilateral SLN, omentectomy on 3/18/24 meds: triemterine 37.5 qd, meclizine prn, CPAP, nasal spray prn social: denies Fhx: Father w/ prostate ca (dx at 70's yo), 2 maternal aunts w/ breast ca (dx in their 50's), first cousin (maternal) w/ breast cancer (dx 41 yo). No genetic testing performed. Cousin w/ sarcoidosis  Last pap smear: last pap smear 2023 (normal) mammogram: 2023 wnl colonoscopy: last c-scope 10 years ago (wnl) cologuard 2022 wnl

## 2024-04-24 NOTE — DISCUSSION/SUMMARY
[Reviewed Clinical Lab Test(s)] : Results of clinical tests were reviewed. [Discuss Tests w/Referring Providers] : Results of labs/radiology studies and the treatment recommendations were discussed with performing/referring physician. [Discuss Alternatives/Risks/Benefits w/Patient] : All alternatives, risks, and benefits were discussed with the patient/family and all questions were answered.  Patient expressed good understanding and appreciates the importance of follow up as recommended. [Diagnosis/Stage ___] : Given this data, a diagnosis of [unfilled] is rendered. [FreeTextEntry1] : 62 yo s/p staging surgery for 1A uterine carcinosarcoma with Dr. De Oliveira presenting for consultation for adjuvant treatment.  NCCN guidelines for stage I carcinosarcoma discussed in detail. Carcinosarcomas are metaplastic carcinomas and not truly sarcomas, and are therefore included in the high-risk malignant epithelial tumors section of the NCCN guidelines. Multimodality treatment is typically recommended for these histologically aggressive tumors.   For patients with stage IA disease, with myometrial invasion, chemotherapy with or without RT is the preferred treatment option. Adjuvant platinum/taxane based therapy appears to improve survival, and recent phase II data suggests that it is non-inferior to the traditionally prescribed ifosfamide/paclitaxel, with a response rate of 54%.  Tumor board recommendation was for T/C x 6 cycles  with vaginal brachytherapy.   [] chemo teach performed [] awaiting pathology slide review, will commence chemo once that is performed

## 2024-04-24 NOTE — PHYSICAL EXAM
[Chaperone Present] : A chaperone was present in the examining room during all aspects of the physical examination [Absent] : Adnexa(ae): Absent [Normal] : Recto-Vaginal Exam: Normal [Fully active, able to carry on all pre-disease performance without restriction] : Status 0 - Fully active, able to carry on all pre-disease performance without restriction [de-identified] : large midline vertical [de-identified] : vaginal cuff well healed

## 2024-04-25 ENCOUNTER — NON-APPOINTMENT (OUTPATIENT)
Age: 64
End: 2024-04-25

## 2024-04-26 NOTE — ASSESSMENT
[FreeTextEntry1] : Pathology was reviewed in detail with the patient.    Agatha Accession Number : [id number] Patient:     HAYES ANDINO   Accession:                             [id number]  Collected Date/Time:                   3/18/2024 09:52 EDT Received Date/Time:                    3/18/2024 10:01 EDT  Surgical Pathology Report - Auth (Verified)  Specimen(s) Submitted 1. Uterus, cervix, bilateral tubes and ovaries and endometrial mass 2. Left pelvic lymph node 3. Right pelvic lymph node 4. Omentum  Final Diagnosis   1. Uterus, cervix, bilateral tubes and ovaries and endometrial mass, resection: - Carcinosarcoma, 9 cm in greatest dimension, exophytic and friable detached mass with focal residual carcinoma component limited to the endometrium pT1a pN0 (please see the comment and synoptic report below) - No myometrial invasion seen - All resection margins are negative for tumor - No lymphovascular invasion seen - Multiple leiomyoms, intramural and subserosal - Bilateral tubes and ovaries sections with no significant pathologic findings - Cervix sections with no significant pathologic findings  2. Left pelvic lymph node, excision: - One lymph node, negative for metastatic tumor (0/1)  3. Right pelvic lymph node, excision: - Six lymph node, negative for metastatic tumor (0/6)  4. Omentum, excision: - Fibroadipose tissue with reactive changes and focal chronic inflammation, negative for metastatic tumor  Verified by: Cam Fischer MD (Electronic Signature) Reported on: 03/25/24 11:14 EDT, [facility], [place], [address] [place] [address] Phone: [phone]   Fax: [phone] _________________________________________________________________  Comment The carcinomatous component shows serous differentiation supported by immuno stains ([id number] "null pattern" and diffuse p16 labeling). The  mesenchymal component consists of a high-grade sarcoma with heterologous chondrosarcoma element. A selected slide (1Q) of the tumor was reviewed intra departmentally  with another pathologist and the case was reported to the cancer registry.  Synoptic Summary 1: Endometrium - Hysterectomy Specimen Procedure:  Total hysterectomy and bilateral salpingo-oophorectomy;  Omentectomy Tumor Histologic Type:   Carcinosarcoma Myometrial Invasion:   Not identified Uterine Serosa Involvement:   Not identified Lower Uterine Segment Involvement:    Not identified Cervical Stromal Involvement:   Not identified Not identified Other Tissue / Organ Involvement: Lymphatic and / or Vascular Invasion:    Not identified Margins Margin Status:   All margins negative for invasive carcinoma Regional Lymph Nodes Regional Lymph Node Status:   All regional lymph nodes negative for tumor cells Lymph Nodes Examined Total Number of Pelvic Nodes Examined:    7 Total Number of Para-aortic Nodes Examined:    Cannot be determined - Not submitted pTNM Classification (AJCC 8th Edition) pT Category:   pT1a pN Category:   pN0 Best Tumor Blocks for Future Studies Tumor Block(s):   Block 1Q has good neoplastic content for further studies if clinically indicated.  CAP eCP 2023 Q4 Release  Intraoperative Consultation The specimen is submitted for INTRAOPERATIVE CONSULTATION and the FROZEN SECTION diagnosis is reported at [address] [place] [address] as:  1,2: High grade carcinoma on examined sections  Received at: 10:00 am Verbally reported at: 10:30 am by: Dr. EDWIGE Fischer to:  Dr. JUSTINE Vyas who repeated and verified.  This frozen section was reviewed in consultation with Dr. ANA MARIA Crews and the frozen section diagnosis represents a consensus opinion.  The frozen section and frozen section control has been reviewed by the final pathologist who verified this report   Clinical Information Exploratory laparotomy, total abdominal hysterectomy, bilateral salpingo-oophorectomy  Perioperative Diagnosis Endometrial mass  Postoperative Diagnosis Endometrial mass   Gross Description 1. The specimen is received fresh for frozen labeled "uterus, cervix, bilateral tubes and ovaries, endometrial mass" and consists of distorted uterus weighing 977 g and measuring 10 cm cornu to cornu by 12 cm anterior to posterior by 10 cm superior to inferior, detached cervix measuring 4 x 4 x 3 cm, and attached bilateral adnexa measuring as follows: right ovary 3.5 x 2 x 1 cm, right fallopian tube 11 cm in length and 0.8 cm in diameter, left ovary 3.3 x 1.5 x 1cm, left fallopian tube 10.5 cm in length and 1 cm in diameter. The uterine serosa is tan-pink and smooth.  Additionally multiple separate tissue fragments found in the bucket measuring 15 x 12 x 1.5 cm in aggregate with a separate distinct semi solid pink friable mass measuring 9 x 5 x 2 cm. he mass is hemorrhagic appearing at one end and the other end is light pink and necrotic appearing.  The specimen is inked as follows: anterior - green, posterior - black. The ectocervix is white-gray, smooth with a 0.3 cm fishmouth os.  The endometrial cavity, lined by 9 x 6 x 3 cm, is lined by a 0.1 cm tan-pink, trabeculated endometrium . In the fundal aspect of the endometrial cavity there is a 0.8 cm in maximum dimension sized endometrial polyp.  The myometrium is tan-pink and up to 4.2 cm in thickness. Multiple submucosal, intramural and subserosal nodules are present with the largest measuring 6 cm in greatest dimension. The cut surfaces of the nodules are  tan-white and whorled without hemorrhage or necrotic areas. The right fallopian tube is fimbriated, tan-pink and smooth. The right ovary is yellow-tan to tan-white. The left fallopian tube is fimbriated, tan-pink and smooth,. The left ovary is yellow-tan to tan-white. Serial sectioning of the bilateral adnexa shows unremarkable cut surfaces. Representative sections are submitted.  Summary of Sections: 1A-1B- cervix -2 1C- Anterior lower uterine segment -1 1D- Posterior lower uterine segment -1 1E- Anterior endomyometrium -1 1F- Posterior endomyometrium -1 1G-Right ovary -1 1H-Left ovary -1 1I- Right fallopian tube -1 1J- Left fallopian tube -1 1K- Parametrial tissue- 1 1L -4S-Eghyiqgpc-2 1N-1R -Endometrial mass -5  Total: 18 blocks  2.  The specimen is received in formalin labeled "left pelvic lymph node" and consist of fibroadipose tissue and lymphoid tissue measuring 2.3 x 2.2 x 0.8 cm.   The specimen is submitted entirely.               (1 block)  3.  The specimen is received in formalin labeled "right pelvic lymph node" and consist of fibroadipose tissue and tan soft to firm lymphoid tissue measuring 3.5 x 2 x 1 cm.   The specimen is submitted entirely.  (2 blocks)  4.  The specimen is received in formalin labeled "omentum" and consists of an irregular fragment of  soft, yellow to tan pink, fibromembranous tissue measuring 17 x 15 x 2 cm. Representative sections are submitted. (3 blocks)  03/18/2024 16:08:36 EDT ys  Disclaimer In addition to other data that may appear on the specimen containers, all labels have been inspected to confirm the presence of the patient's name and date of birth.  Specimen was received and underwent gross examination at [facility], [address] [place] [address].  Ordered by: TEODORO VYAS       Collected/Examined: 18Mar2024 09:52AM       Verification Required       Stage: Final       Performed at: [facility]       Resulted: 25Mar2024 11:14AM       Last Updated: 25Mar2024 11:15AM       Accession: [id number]       Results Hx:	 There are no additional results to show Details:	 Scheduled:	 Status:	Resulted: Requires Verification For:	 Recorded as History:	25Mar2024 11:15AM Overdue:	25Jun2024 12:00AM To Be Performed:	 Communicated By:	Recorded Priority:	Routine Ordered By:	TEODORO VYAS Supervised By:	TEODORO VYAS Managed By:	TEODORO VYAS Authorization:	Not Required Performing Instructions:	 Patient Instructions:	 Order Instructions:	 Questions:	          (none) Add'l Details:	 Financial Auth:	 Authorization #:	 Appt. Status:	Appointment Not Needed Effective:	25Mar2024 11:15AM Expires:	25Mar2024 11:15AM Done:	18Mar2024 09:52AM Order #:	[id number] Requisition #:	[id number] Label Type:	 Collection:	Collection Specimen Identifier:	 ID:	 CPT:	 LOINC:	 SNOMED:	 Type:	 Charges:	None Will Be Collected in Office?	No View link item history	 Goals:	None Charging:	 Override Encounter Details: Special Billing:	 Account #:	 Injury Date:	3/25/2024 11:15:02 AM Description:	 Encounters:	 Creation:	18Mar2024 Result Review TEODORO VYAS (GYN Oncology)  Collection:	None Specified Be Done By:	None Specified Scheduled:	None Specified Performed:	None Specified Charge :	None Specified Annotations:	          (none)

## 2024-04-26 NOTE — REASON FOR VISIT
[Post Op] : post op visit [de-identified] : 3/18/2024 [de-identified] : TLH, BSO, LNS, Surgical Staging

## 2024-04-29 ENCOUNTER — APPOINTMENT (OUTPATIENT)
Dept: HEMATOLOGY ONCOLOGY | Facility: CLINIC | Age: 64
End: 2024-04-29

## 2024-04-29 ENCOUNTER — OUTPATIENT (OUTPATIENT)
Dept: OUTPATIENT SERVICES | Facility: HOSPITAL | Age: 64
LOS: 1 days | End: 2024-04-29
Payer: MEDICAID

## 2024-04-29 DIAGNOSIS — Z90.49 ACQUIRED ABSENCE OF OTHER SPECIFIED PARTS OF DIGESTIVE TRACT: Chronic | ICD-10-CM

## 2024-04-29 DIAGNOSIS — C80.1 MALIGNANT (PRIMARY) NEOPLASM, UNSPECIFIED: ICD-10-CM

## 2024-04-29 DIAGNOSIS — Z98.891 HISTORY OF UTERINE SCAR FROM PREVIOUS SURGERY: Chronic | ICD-10-CM

## 2024-04-29 NOTE — DISCUSSION/SUMMARY
[FreeTextEntry1] : REASON FOR CONSULT Aruna Singh is a 64-year-old female who was referred by Dr. Krista Hartman for cancer genetic counseling and risk assessment due to a recent uterine cancer diagnosis and a family history of breast cancer.  RELEVANT MEDICAL HISTORY Ms. Singh was recently diagnosed with uterine cancer at 64 years old in 2024. Pathology report revealed uterine carcinosarcoma with multiple leiomyomas. She was treated with a hysterectomy and bilateral salpingo-oophorectomy with omentectomy in 2024 and is planning to start radiation therapy and chemotherapy.   OTHER MEDICAL AND SURGICAL HISTORY: -	Medical History: hypertension, pre-diabetes, vertigo, obstructive sleep apnea -	Surgical History: hysterectomy and bilateral salpingo-oophorectomy (2024),  section x2, open cholecystectomy ()  PAST OB/GYN HISTORY: Obstetrical History:  Age at Menarche: 12 Menopausal with LMP at 54  Age at First Live Birth: 24 Oral Contraceptive Use: No Hormone Replacement Therapy: No  CANCER SCREENING HISTORY:   Breast:  -	Mammography: last , reportedly normal  -	Sonography: No -	MRI: No -	Biopsies: No GYN: -	Pelvic Examination: last 2023, normal -	Sonography: last 2024, question giant endometrial polyp vs. abnormal endometrial echo was identified as well as leiomyomatous uterus, mild caliectasis right kidney, and prominent column of Eb left kidney. Tissue sampling was recommended.   -	CA-125: No Colon: -	Colonoscopy: last 10 years ago, reportedly normal. Next colonoscopy was recommended in 10 years. Patient denies a history of colorectal polyps on previous colonoscopies. -	Upper Endoscopy: No -	FOBT: last , reportedly normal  Skin:   -	FBSE: No -	Lesions biopsied/removed: Yes, patient reports having benign skin tags excised.   SOCIAL HISTORY: -	Tobacco-product use: No  FAMILY HISTORY: Maternal ancestry was reported as Chinese, Portuguese, Czech, and Eastern  Lutheran and paternal ancestry was reported as Chinese. Consanguinity was denied. A detailed family history of cancer was ascertained. Relevant diagnoses are detailed below and in the scanned pedigree.   To Ms. Singh's knowledge, no one in the family has had germline testing for cancer susceptibility.   	 	RISK ASSESSMENT: Ms. Singh's personal history of uterine cancer and/or family history of breast cancer is suggestive of an inherited predisposition to breast cancer and related cancers. We recommended genetic testing for genes associated with breast and gynecological cancer. This test analyzes 20 genes: REHANA, BARD1, BRCA1, BRCA2, BRIP1, CDH1, CHEK2, EPCAM, FH, MLH1, MSH2, MSH6, NF1, PALB2, PMS2, PTEN, RAD51C, RAD51D, STK11, and TP53.  We discussed the risks, benefits and limitations, and implications of genetic testing. We also discussed the psychosocial implications of genetic testing. Possible test results were reviewed with Ms. Singh, along with associated medical management options.   Ms. Singh consented to the above-mentioned genetic testing panel. Blood was drawn in our laboratory and sent to [facility] today.  PLAN:  1.	Blood drawn today will be sent to [facility] for analysis.  2.	We will contact Ms. Singh once the results are available and will schedule a follow-up appointment, as needed. Results generally return in 2-3 weeks from the day the sample is received in the lab.  For any additional questions please call Cancer Genetics at [phone].    Bridget Soto MS, [facility] Genetic Counselor, Cancer Genetics   CC:  Dr. Krista Hartman

## 2024-04-30 ENCOUNTER — RESULT REVIEW (OUTPATIENT)
Age: 64
End: 2024-04-30

## 2024-04-30 ENCOUNTER — NON-APPOINTMENT (OUTPATIENT)
Age: 64
End: 2024-04-30

## 2024-04-30 PROCEDURE — 88321 CONSLTJ&REPRT SLD PREP ELSWR: CPT

## 2024-05-01 LAB — SURGICAL PATHOLOGY STUDY: SIGNIFICANT CHANGE UP

## 2024-05-01 RX ORDER — DEXAMETHASONE 4 MG/1
4 TABLET ORAL
Qty: 15 | Refills: 6 | Status: ACTIVE | COMMUNITY
Start: 2024-05-01 | End: 1900-01-01

## 2024-05-01 RX ORDER — ONDANSETRON 8 MG/1
8 TABLET ORAL EVERY 8 HOURS
Qty: 30 | Refills: 3 | Status: ACTIVE | COMMUNITY
Start: 2024-05-01 | End: 1900-01-01

## 2024-05-01 RX ORDER — ELECTROLYTES/DEXTROSE
100 SOLUTION, ORAL ORAL TWICE DAILY
Qty: 60 | Refills: 11 | Status: ACTIVE | COMMUNITY
Start: 2024-05-01 | End: 1900-01-01

## 2024-05-01 RX ORDER — PROCHLORPERAZINE MALEATE 10 MG/1
10 TABLET ORAL EVERY 6 HOURS
Qty: 32 | Refills: 3 | Status: ACTIVE | COMMUNITY
Start: 2024-05-01 | End: 1900-01-01

## 2024-05-02 ENCOUNTER — NON-APPOINTMENT (OUTPATIENT)
Age: 64
End: 2024-05-02

## 2024-05-06 ENCOUNTER — APPOINTMENT (OUTPATIENT)
Dept: INTERVENTIONAL RADIOLOGY/VASCULAR | Facility: HOSPITAL | Age: 64
End: 2024-05-06

## 2024-05-06 ENCOUNTER — OUTPATIENT (OUTPATIENT)
Dept: OUTPATIENT SERVICES | Facility: HOSPITAL | Age: 64
LOS: 1 days | End: 2024-05-06
Payer: MEDICAID

## 2024-05-06 ENCOUNTER — RESULT REVIEW (OUTPATIENT)
Age: 64
End: 2024-05-06

## 2024-05-06 DIAGNOSIS — Z98.891 HISTORY OF UTERINE SCAR FROM PREVIOUS SURGERY: Chronic | ICD-10-CM

## 2024-05-06 DIAGNOSIS — Z90.49 ACQUIRED ABSENCE OF OTHER SPECIFIED PARTS OF DIGESTIVE TRACT: Chronic | ICD-10-CM

## 2024-05-06 PROCEDURE — 99153 MOD SED SAME PHYS/QHP EA: CPT

## 2024-05-06 PROCEDURE — 36561 INSERT TUNNELED CV CATH: CPT

## 2024-05-06 PROCEDURE — 77001 FLUOROGUIDE FOR VEIN DEVICE: CPT

## 2024-05-06 PROCEDURE — 99152 MOD SED SAME PHYS/QHP 5/>YRS: CPT

## 2024-05-06 PROCEDURE — C1769: CPT

## 2024-05-06 PROCEDURE — C1788: CPT

## 2024-05-07 ENCOUNTER — NON-APPOINTMENT (OUTPATIENT)
Age: 64
End: 2024-05-07

## 2024-05-07 RX ORDER — CARBOPLATIN 50 MG
899 VIAL (EA) INTRAVENOUS ONCE
Refills: 0 | Status: COMPLETED | OUTPATIENT
Start: 2024-05-08 | End: 2024-05-08

## 2024-05-08 ENCOUNTER — OUTPATIENT (OUTPATIENT)
Dept: OUTPATIENT SERVICES | Facility: HOSPITAL | Age: 64
LOS: 1 days | End: 2024-05-08
Payer: MEDICAID

## 2024-05-08 ENCOUNTER — APPOINTMENT (OUTPATIENT)
Dept: INFUSION THERAPY | Facility: CLINIC | Age: 64
End: 2024-05-08

## 2024-05-08 ENCOUNTER — NON-APPOINTMENT (OUTPATIENT)
Age: 64
End: 2024-05-08

## 2024-05-08 VITALS
TEMPERATURE: 98 F | DIASTOLIC BLOOD PRESSURE: 72 MMHG | OXYGEN SATURATION: 98 % | RESPIRATION RATE: 17 BRPM | SYSTOLIC BLOOD PRESSURE: 124 MMHG | HEART RATE: 76 BPM

## 2024-05-08 VITALS
OXYGEN SATURATION: 99 % | SYSTOLIC BLOOD PRESSURE: 123 MMHG | DIASTOLIC BLOOD PRESSURE: 80 MMHG | RESPIRATION RATE: 16 BRPM | HEIGHT: 66 IN | HEART RATE: 75 BPM | TEMPERATURE: 99 F | WEIGHT: 238.1 LBS

## 2024-05-08 DIAGNOSIS — C55 MALIGNANT NEOPLASM OF UTERUS, PART UNSPECIFIED: ICD-10-CM

## 2024-05-08 DIAGNOSIS — Z98.891 HISTORY OF UTERINE SCAR FROM PREVIOUS SURGERY: Chronic | ICD-10-CM

## 2024-05-08 DIAGNOSIS — Z90.49 ACQUIRED ABSENCE OF OTHER SPECIFIED PARTS OF DIGESTIVE TRACT: Chronic | ICD-10-CM

## 2024-05-08 PROCEDURE — 96375 TX/PRO/DX INJ NEW DRUG ADDON: CPT

## 2024-05-08 PROCEDURE — 96415 CHEMO IV INFUSION ADDL HR: CPT

## 2024-05-08 PROCEDURE — 96367 TX/PROPH/DG ADDL SEQ IV INF: CPT

## 2024-05-08 PROCEDURE — 96413 CHEMO IV INFUSION 1 HR: CPT

## 2024-05-08 PROCEDURE — 96417 CHEMO IV INFUS EACH ADDL SEQ: CPT

## 2024-05-08 RX ORDER — PACLITAXEL 6 MG/ML
350 INJECTION, SOLUTION, CONCENTRATE INTRAVENOUS ONCE
Refills: 0 | Status: COMPLETED | OUTPATIENT
Start: 2024-05-08 | End: 2024-05-08

## 2024-05-08 RX ORDER — FOSAPREPITANT DIMEGLUMINE 150 MG/5ML
150 INJECTION, POWDER, LYOPHILIZED, FOR SOLUTION INTRAVENOUS ONCE
Refills: 0 | Status: COMPLETED | OUTPATIENT
Start: 2024-05-08 | End: 2024-05-08

## 2024-05-08 RX ORDER — PALONOSETRON HYDROCHLORIDE 0.25 MG/5ML
0.25 INJECTION, SOLUTION INTRAVENOUS ONCE
Refills: 0 | Status: COMPLETED | OUTPATIENT
Start: 2024-05-08 | End: 2024-05-08

## 2024-05-08 RX ORDER — FAMOTIDINE 10 MG/ML
20 INJECTION INTRAVENOUS ONCE
Refills: 0 | Status: COMPLETED | OUTPATIENT
Start: 2024-05-08 | End: 2024-05-08

## 2024-05-08 RX ORDER — DEXAMETHASONE 0.5 MG/5ML
12 ELIXIR ORAL ONCE
Refills: 0 | Status: COMPLETED | OUTPATIENT
Start: 2024-05-08 | End: 2024-05-08

## 2024-05-08 RX ORDER — DIPHENHYDRAMINE HCL 50 MG
25 CAPSULE ORAL ONCE
Refills: 0 | Status: COMPLETED | OUTPATIENT
Start: 2024-05-08 | End: 2024-05-08

## 2024-05-08 RX ADMIN — Medication 202 MILLIGRAM(S): at 11:15

## 2024-05-08 RX ADMIN — PACLITAXEL 350 MILLIGRAM(S): 6 INJECTION, SOLUTION, CONCENTRATE INTRAVENOUS at 14:55

## 2024-05-08 RX ADMIN — Medication 12 MILLIGRAM(S): at 11:10

## 2024-05-08 RX ADMIN — PACLITAXEL 350 MILLIGRAM(S): 6 INJECTION, SOLUTION, CONCENTRATE INTRAVENOUS at 11:31

## 2024-05-08 RX ADMIN — Medication 899 MILLIGRAM(S): at 15:32

## 2024-05-08 RX ADMIN — FOSAPREPITANT DIMEGLUMINE 150 MILLIGRAM(S): 150 INJECTION, POWDER, LYOPHILIZED, FOR SOLUTION INTRAVENOUS at 10:47

## 2024-05-08 RX ADMIN — Medication 212 MILLIGRAM(S): at 10:56

## 2024-05-08 RX ADMIN — FAMOTIDINE 20 MILLIGRAM(S): 10 INJECTION INTRAVENOUS at 10:48

## 2024-05-08 RX ADMIN — Medication 899 MILLIGRAM(S): at 15:02

## 2024-05-08 RX ADMIN — PALONOSETRON HYDROCHLORIDE 0.25 MILLIGRAM(S): 0.25 INJECTION, SOLUTION INTRAVENOUS at 10:55

## 2024-05-08 RX ADMIN — Medication 25 MILLIGRAM(S): at 11:30

## 2024-05-08 RX ADMIN — FOSAPREPITANT DIMEGLUMINE 500 MILLIGRAM(S): 150 INJECTION, POWDER, LYOPHILIZED, FOR SOLUTION INTRAVENOUS at 10:18

## 2024-05-13 ENCOUNTER — NON-APPOINTMENT (OUTPATIENT)
Age: 64
End: 2024-05-13

## 2024-05-13 ENCOUNTER — APPOINTMENT (OUTPATIENT)
Dept: INTERVENTIONAL RADIOLOGY/VASCULAR | Facility: HOSPITAL | Age: 64
End: 2024-05-13

## 2024-05-13 NOTE — DISCUSSION/SUMMARY
[FreeTextEntry1] : RESULTS TRANSMISSION Aruna Singh is a 64-year-old female who was called on 05/13/2024 for a discussion regarding their genetic testing results related to hereditary cancer predisposition.   Ms. Singh was originally seen at Cancer Genetics on 04/29/2024 for hereditary cancer predisposition risk assessment due to a recent uterine cancer diagnosis and a family history of breast cancer. Ms. Singh decided to pursue genetic testing for genes associated with breast cancer and gynecological cancers offered at [facility].  TEST RESULTS: NEGATIVE  No pathogenic (disease-causing) variants or VUSs were detected in the following genes:  REHANA, BARD1, BRCA1, BRCA2, BRIP1, CDH1, CHEK2, EPCAM, FH, MLH1, MSH2, MSH6, NF1, PALB2, PMS2, PTEN, RAD51C, RAD51D, STK11, and TP53.   RESULTS INTERPRETATION AND ASSESSMENT: Given Ms. Singh's personal and current reported family history of cancer, and her negative genetic test results, the following screening guidelines and risk-reducing recommendations were discussed:  GYN:  - Long-term management and surveillance should be based on Ms. Singh's on- or post-treatment protocol as recommended by her oncology team.  OTHER:  - In the absence of other indications, Ms. Singh should practice age-appropriate cancer screening of other organ systems as recommended for the general population.  We also discussed that, while no cause of the patient's personal and family history of cancer was identified, this result, while reassuring, does entirely not rule out a hereditary cancer risk in the patient. It is possible, although unlikely, the patient has a mutation in one of the genes tested that is not detectable by this analysis, or has a mutation in a different gene, either known or unknown. It is also possible there is a hereditary cancer predisposition in the family, but the patient did not inherit it.  We informed Ms. Singh that our knowledge of genetics and inherited cancer conditions is changing rapidly. Therefore, we recommended that Ms. Singh contact our office, every 2 to 3 years, to discuss relevant advances in cancer genetics.  We emphasized the importance of re-contacting us with updates regarding her personal and family history of cancer as well as any updates regarding additional cancer genetic test results performed for the patient and/or family members.  Such updates could possibly change our risk assessment and recommendations.   In addition, we discussed Ms. Singh's maternal first cousins could consider pursuing cancer risk assessment genetic counseling with the option of genetic testing.   PLAN: 1.See above for recommended screening and risk-reduction strategies. 2. Patient informed consult note(s) will be available through their [facility] patient portal and genetic test results will be released via [facility] portal.  3. Ms. Singh was encouraged to contact us every 2-3 years to discuss relevant advances in cancer genetics, or sooner if there are any changes in her personal or family history of cancer.   For any additional questions please call Cancer Genetics at [phone].    Bridget Soto MS, [place] Genetic Counselor, Cancer Genetics   CC:  Patient Dr. Krista Hartman

## 2024-05-21 ENCOUNTER — APPOINTMENT (OUTPATIENT)
Dept: HEPATOLOGY | Facility: CLINIC | Age: 64
End: 2024-05-21
Payer: MEDICAID

## 2024-05-21 VITALS
SYSTOLIC BLOOD PRESSURE: 118 MMHG | HEART RATE: 70 BPM | OXYGEN SATURATION: 100 % | BODY MASS INDEX: 37.77 KG/M2 | TEMPERATURE: 97.7 F | HEIGHT: 66 IN | RESPIRATION RATE: 16 BRPM | DIASTOLIC BLOOD PRESSURE: 78 MMHG | WEIGHT: 235 LBS

## 2024-05-21 PROCEDURE — 99204 OFFICE O/P NEW MOD 45 MIN: CPT

## 2024-05-22 ENCOUNTER — APPOINTMENT (OUTPATIENT)
Dept: HEMATOLOGY ONCOLOGY | Facility: CLINIC | Age: 64
End: 2024-05-22
Payer: MEDICAID

## 2024-05-22 PROCEDURE — 90791 PSYCH DIAGNOSTIC EVALUATION: CPT | Mod: 93

## 2024-05-23 ENCOUNTER — APPOINTMENT (OUTPATIENT)
Dept: GYNECOLOGIC ONCOLOGY | Facility: CLINIC | Age: 64
End: 2024-05-23
Payer: MEDICAID

## 2024-05-23 ENCOUNTER — LABORATORY RESULT (OUTPATIENT)
Age: 64
End: 2024-05-23

## 2024-05-23 VITALS
HEIGHT: 66 IN | BODY MASS INDEX: 37.77 KG/M2 | SYSTOLIC BLOOD PRESSURE: 127 MMHG | WEIGHT: 235 LBS | DIASTOLIC BLOOD PRESSURE: 82 MMHG | OXYGEN SATURATION: 98 % | TEMPERATURE: 98 F | HEART RATE: 82 BPM

## 2024-05-23 DIAGNOSIS — Z51.11 ENCOUNTER FOR ANTINEOPLASTIC CHEMOTHERAPY: ICD-10-CM

## 2024-05-23 PROCEDURE — 99214 OFFICE O/P EST MOD 30 MIN: CPT

## 2024-05-23 RX ORDER — LORAZEPAM 0.5 MG/1
0.5 TABLET ORAL
Qty: 30 | Refills: 0 | Status: ACTIVE | COMMUNITY
Start: 2024-05-23 | End: 1900-01-01

## 2024-05-23 NOTE — DISCUSSION/SUMMARY
[Reviewed Clinical Lab Test(s)] : Results of clinical tests were reviewed. [Discuss Tests w/Referring Providers] : Results of labs/radiology studies and the treatment recommendations were discussed with performing/referring physician. [Discuss Alternatives/Risks/Benefits w/Patient] : All alternatives, risks, and benefits were discussed with the patient/family and all questions were answered.  Patient expressed good understanding and appreciates the importance of follow up as recommended. [Diagnosis/Stage ___] : Given this data, a diagnosis of [unfilled] is rendered. [FreeTextEntry1] : 62 yo s/p staging surgery for 1A uterine carcinosarcoma on adjuvant T/C   []C2 T/C 5/29 []pre chemo labs drawn []chemotherapy calendar given []Long discussion regarding chemotherapy side effects. Will continue with Dr. Castillo for psychotherapy. Discussed starting SSRI, patient declines for now. Constipation - will start miralax daily with chemotherapy to prevent constipation. Sleep- discussed sleep hygiene, will trial Ativan low dose as needed for sleep. Interested in medical marijuana, referred to Dr. Barahona. Discussed oral lidocaine if mouth sores return and are painful.  Follow up prior to C3

## 2024-05-23 NOTE — PHYSICAL EXAM
[Chaperone Present] : A chaperone was present in the examining room during all aspects of the physical examination [Normal] : Palpation of skin and subcutaneous tissue: Normal turgor [Fully active, able to carry on all pre-disease performance without restriction] : Status 0 - Fully active, able to carry on all pre-disease performance without restriction [de-identified] : large midline vertical

## 2024-05-23 NOTE — HISTORY OF PRESENT ILLNESS
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 The uterine canal is lobulated and markedly distended, possibly with blood products. Multiple bulky fibroids. Difficult to distinguish endometrial layer from intracanal contents. Findings are concerning for a neoplastic process. Gynecological consultation and tissue sampling recommended. 2. Nonspecific prominent lymph nodes, catalogued above. 3. Questionable left mid interpolar renal lesion versus loculated normal renal parenchyma. Can correlate with with outpatient MRI with intravenous contrast for further evaluation.  1) Hysterectomy, BSO, bilateral SLN, omentectomy 3/18/24 with Dr. De Oliveira     1. Uterus, cervix, bilateral tubes and ovaries and endometrial mass, resection: - Carcinosarcoma, 9 cm in greatest dimension, exophytic and friable detached mass with focal residual carcinoma component limited to the endometrium pT1a pN0 (please see the comment and synoptic report below) - No myometrial invasion seen - All resection margins are negative for tumor - No lymphovascular invasion seen - Multiple leiomyoms, intramural and subserosal - Bilateral tubes and ovaries sections with no significant pathologic findings - Cervix sections with no significant pathologic findings  2. Left pelvic lymph node, excision: - One lymph node, negative for metastatic tumor (0/1)  3. Right pelvic lymph node, excision: - Six lymph node, negative for metastatic tumor (0/6)  4. Omentum, excision: - Fibroadipose tissue with reactive changes and focal chronic inflammation, negative for metastatic tumor 6) Chest CT 4/18/24 wnl  7) Taxol 175mg/m2 + Carbu AUC 6 initiated 5/8/24  Here for follow up prior to C2 Taxol/Carbo 5/29. Feeling ok, found the first cycle of chemotherapy very difficult with more side effects than anticipated and for longer than anticiipated. Had visit with Dr. Castillo yesterday which was very helpful for her. Denies neuropathy, nausea and vomiting. Feeling basically back to herself and very hungry  since yesterday. Summary of chemotherapy side effects: Chemo brain is most bothersome symptom with trouble focusing first week. Fatigue. Constipation -  around D5 went 3 days without a bowel movement, now is going every other day. - took miralax with good relief. Took colace during the constipation but had significant abdominal pain with it. Mouth sores- resolved with decreasing citrus and salt bathes. thinks may be related to sensitivity to fruits rather than chemothereapy as she has had it before with pineapple. Joint pain started evening of the D2  - resolved with tylenol and CBD rubs. Caused difficulty sleeping due to pain when she would not take Tylenol. Denies nausea but had lack of taste - was able to eat small portions. Took protein shakes. Took citrus fruits before eating. Neuropathy - mild  in feet and hands resolved at this point. Reports trouble sleeping - wakes up ever 2 hours. Temperature sensitivity especially to heat. Reports emotional changes - anger/depression. Frustrated to not feel like herself. Tried to exercise when able to.    OBhx: CSx2, SAB Gynhx: Remote hx of abnormal pap smear (normal since then), hx fibroids. Menopause since age 54y.  PMH: asthma as a child, HTN, pre-diabetes, vertigo (saw neurologist and ENT), TAISHA PSH: CSx2, open cholecystectomy 1988, JIMI, BSO, bilateral SLN, omentectomy on 3/18/24 meds: triemterine 37.5 qd, meclizine prn, CPAP, nasal spray prn social: denies Fhx: Father w/ prostate ca (dx at 70's yo), 2 maternal aunts w/ breast ca (dx in their 50's), first cousin (maternal) w/ breast cancer (dx 41 yo). No genetic testing performed. Cousin w/ sarcoidosis  Last pap smear: last pap smear 2023 (normal) mammogram: 2023 wnl colonoscopy: last c-scope 10 years ago (wnl) cologuard 2022 wnl

## 2024-05-24 LAB
ALBUMIN SERPL ELPH-MCNC: 4.6 G/DL
ALP BLD-CCNC: 65 U/L
ALT SERPL-CCNC: 61 U/L
ANION GAP SERPL CALC-SCNC: 13 MMOL/L
AST SERPL-CCNC: 34 U/L
BASOPHILS # BLD AUTO: 0.04 K/UL
BASOPHILS NFR BLD AUTO: 1.3 %
BILIRUB SERPL-MCNC: <0.2 MG/DL
BUN SERPL-MCNC: 19 MG/DL
CALCIUM SERPL-MCNC: 9.4 MG/DL
CANCER AG125 SERPL-ACNC: 5 U/ML
CHLORIDE SERPL-SCNC: 100 MMOL/L
CHOLEST SERPL-MCNC: 203 MG/DL
CO2 SERPL-SCNC: 27 MMOL/L
CREAT SERPL-MCNC: 0.68 MG/DL
EGFR: 98 ML/MIN/1.73M2
EOSINOPHIL # BLD AUTO: 0.03 K/UL
EOSINOPHIL NFR BLD AUTO: 1 %
FERRITIN SERPL-MCNC: 568 NG/ML
GGT SERPL-CCNC: 61 U/L
HCT VFR BLD CALC: 37.4 %
HDLC SERPL-MCNC: 45 MG/DL
HGB BLD-MCNC: 11.8 G/DL
IMM GRANULOCYTES NFR BLD AUTO: 0.3 %
INR PPP: 0.94 RATIO
IRON SATN MFR SERPL: 26 %
IRON SERPL-MCNC: 75 UG/DL
LDLC SERPL CALC-MCNC: 128 MG/DL
LYMPHOCYTES # BLD AUTO: 1.51 K/UL
LYMPHOCYTES NFR BLD AUTO: 50.5 %
MAGNESIUM SERPL-MCNC: 2.1 MG/DL
MAN DIFF?: NORMAL
MCHC RBC-ENTMCNC: 28.6 PG
MCHC RBC-ENTMCNC: 31.6 GM/DL
MCV RBC AUTO: 90.8 FL
MONOCYTES # BLD AUTO: 0.4 K/UL
MONOCYTES NFR BLD AUTO: 13.4 %
NEUTROPHILS # BLD AUTO: 1 K/UL
NEUTROPHILS NFR BLD AUTO: 33.5 %
NONHDLC SERPL-MCNC: 158 MG/DL
PLATELET # BLD AUTO: 170 K/UL
POTASSIUM SERPL-SCNC: 3.6 MMOL/L
PROT SERPL-MCNC: 7.5 G/DL
PT BLD: 10.6 SEC
RBC # BLD: 4.12 M/UL
RBC # FLD: 14.2 %
SODIUM SERPL-SCNC: 140 MMOL/L
TIBC SERPL-MCNC: 286 UG/DL
TRIGL SERPL-MCNC: 172 MG/DL
UIBC SERPL-MCNC: 210 UG/DL
WBC # FLD AUTO: 2.99 K/UL

## 2024-05-28 LAB
HBV CORE IGG+IGM SER QL: NONREACTIVE
HBV SURFACE AB SER QL: NONREACTIVE
HBV SURFACE AG SER QL: NONREACTIVE
HCV AB SER QL: NONREACTIVE
HCV S/CO RATIO: 0.09 S/CO
HEPATITIS A IGG ANTIBODY: NONREACTIVE
MITOCHONDRIA AB SER IF-ACNC: NORMAL

## 2024-05-28 RX ORDER — CARBOPLATIN 50 MG
889 VIAL (EA) INTRAVENOUS ONCE
Refills: 0 | Status: COMPLETED | OUTPATIENT
Start: 2024-05-29 | End: 2024-05-29

## 2024-05-28 RX ORDER — DIPHENHYDRAMINE HCL 50 MG
25 CAPSULE ORAL ONCE
Refills: 0 | Status: COMPLETED | OUTPATIENT
Start: 2024-05-29 | End: 2024-05-29

## 2024-05-28 RX ORDER — DEXAMETHASONE 0.5 MG/5ML
12 ELIXIR ORAL ONCE
Refills: 0 | Status: COMPLETED | OUTPATIENT
Start: 2024-05-29 | End: 2024-05-29

## 2024-05-29 ENCOUNTER — OUTPATIENT (OUTPATIENT)
Dept: OUTPATIENT SERVICES | Facility: HOSPITAL | Age: 64
LOS: 1 days | End: 2024-05-29
Payer: MEDICAID

## 2024-05-29 ENCOUNTER — APPOINTMENT (OUTPATIENT)
Dept: INFUSION THERAPY | Facility: CLINIC | Age: 64
End: 2024-05-29

## 2024-05-29 VITALS
SYSTOLIC BLOOD PRESSURE: 127 MMHG | HEART RATE: 78 BPM | TEMPERATURE: 98 F | WEIGHT: 238.1 LBS | RESPIRATION RATE: 18 BRPM | OXYGEN SATURATION: 99 % | DIASTOLIC BLOOD PRESSURE: 80 MMHG | HEIGHT: 66 IN

## 2024-05-29 VITALS
SYSTOLIC BLOOD PRESSURE: 124 MMHG | TEMPERATURE: 98 F | OXYGEN SATURATION: 100 % | DIASTOLIC BLOOD PRESSURE: 78 MMHG | HEART RATE: 76 BPM | RESPIRATION RATE: 17 BRPM

## 2024-05-29 DIAGNOSIS — C55 MALIGNANT NEOPLASM OF UTERUS, PART UNSPECIFIED: ICD-10-CM

## 2024-05-29 DIAGNOSIS — Z98.891 HISTORY OF UTERINE SCAR FROM PREVIOUS SURGERY: Chronic | ICD-10-CM

## 2024-05-29 DIAGNOSIS — Z90.49 ACQUIRED ABSENCE OF OTHER SPECIFIED PARTS OF DIGESTIVE TRACT: Chronic | ICD-10-CM

## 2024-05-29 LAB
ANA PAT FLD IF-IMP: ABNORMAL
ANA SER IF-ACNC: ABNORMAL
HCT VFR BLD CALC: 34 % — LOW (ref 34.5–45)
HGB BLD-MCNC: 10.8 G/DL — LOW (ref 11.5–15.5)
LYMPHOCYTES # BLD AUTO: 1.5 K/UL — SIGNIFICANT CHANGE UP (ref 1–3.3)
LYMPHOCYTES # BLD AUTO: 44.1 % — HIGH (ref 13–44)
MCHC RBC-ENTMCNC: 28.6 PG — SIGNIFICANT CHANGE UP (ref 27–34)
MCHC RBC-ENTMCNC: 31.8 GM/DL — LOW (ref 32–36)
MCV RBC AUTO: 90.2 FL — SIGNIFICANT CHANGE UP (ref 80–100)
NEUTROPHILS # BLD AUTO: 1.5 K/UL — LOW (ref 1.8–7.4)
NEUTROPHILS NFR BLD AUTO: 47.2 % — SIGNIFICANT CHANGE UP (ref 43–77)
PLATELET # BLD AUTO: 135 K/UL — LOW (ref 150–400)
RBC # BLD: 3.77 M/UL — LOW (ref 3.8–5.2)
RBC # FLD: 14.2 % — SIGNIFICANT CHANGE UP (ref 10.3–14.5)
WBC # BLD: 3.3 K/UL — LOW (ref 3.8–10.5)
WBC # FLD AUTO: 3.3 K/UL — LOW (ref 3.8–10.5)

## 2024-05-29 PROCEDURE — 96417 CHEMO IV INFUS EACH ADDL SEQ: CPT

## 2024-05-29 PROCEDURE — 96375 TX/PRO/DX INJ NEW DRUG ADDON: CPT

## 2024-05-29 PROCEDURE — 96415 CHEMO IV INFUSION ADDL HR: CPT

## 2024-05-29 PROCEDURE — 96367 TX/PROPH/DG ADDL SEQ IV INF: CPT

## 2024-05-29 PROCEDURE — 36415 COLL VENOUS BLD VENIPUNCTURE: CPT

## 2024-05-29 PROCEDURE — 85025 COMPLETE CBC W/AUTO DIFF WBC: CPT

## 2024-05-29 PROCEDURE — 96413 CHEMO IV INFUSION 1 HR: CPT

## 2024-05-29 RX ORDER — PACLITAXEL 6 MG/ML
350 INJECTION, SOLUTION, CONCENTRATE INTRAVENOUS ONCE
Refills: 0 | Status: COMPLETED | OUTPATIENT
Start: 2024-05-29 | End: 2024-05-29

## 2024-05-29 RX ORDER — FOSAPREPITANT DIMEGLUMINE 150 MG/5ML
150 INJECTION, POWDER, LYOPHILIZED, FOR SOLUTION INTRAVENOUS ONCE
Refills: 0 | Status: COMPLETED | OUTPATIENT
Start: 2024-05-29 | End: 2024-05-29

## 2024-05-29 RX ORDER — FAMOTIDINE 10 MG/ML
20 INJECTION INTRAVENOUS ONCE
Refills: 0 | Status: COMPLETED | OUTPATIENT
Start: 2024-05-29 | End: 2024-05-29

## 2024-05-29 RX ORDER — PALONOSETRON HYDROCHLORIDE 0.25 MG/5ML
0.25 INJECTION, SOLUTION INTRAVENOUS ONCE
Refills: 0 | Status: COMPLETED | OUTPATIENT
Start: 2024-05-29 | End: 2024-05-29

## 2024-05-29 RX ADMIN — FAMOTIDINE 20 MILLIGRAM(S): 10 INJECTION INTRAVENOUS at 10:23

## 2024-05-29 RX ADMIN — Medication 202 MILLIGRAM(S): at 11:25

## 2024-05-29 RX ADMIN — Medication 889 MILLIGRAM(S): at 15:45

## 2024-05-29 RX ADMIN — PACLITAXEL 350 MILLIGRAM(S): 6 INJECTION, SOLUTION, CONCENTRATE INTRAVENOUS at 11:30

## 2024-05-29 RX ADMIN — Medication 25 MILLIGRAM(S): at 11:45

## 2024-05-29 RX ADMIN — FOSAPREPITANT DIMEGLUMINE 500 MILLIGRAM(S): 150 INJECTION, POWDER, LYOPHILIZED, FOR SOLUTION INTRAVENOUS at 10:23

## 2024-05-29 RX ADMIN — PACLITAXEL 350 MILLIGRAM(S): 6 INJECTION, SOLUTION, CONCENTRATE INTRAVENOUS at 15:00

## 2024-05-29 RX ADMIN — Medication 212 MILLIGRAM(S): at 11:10

## 2024-05-29 RX ADMIN — Medication 12 MILLIGRAM(S): at 11:25

## 2024-05-29 RX ADMIN — FOSAPREPITANT DIMEGLUMINE 150 MILLIGRAM(S): 150 INJECTION, POWDER, LYOPHILIZED, FOR SOLUTION INTRAVENOUS at 11:00

## 2024-05-29 RX ADMIN — PALONOSETRON HYDROCHLORIDE 0.25 MILLIGRAM(S): 0.25 INJECTION, SOLUTION INTRAVENOUS at 10:23

## 2024-05-29 RX ADMIN — Medication 889 MILLIGRAM(S): at 15:00

## 2024-05-31 DIAGNOSIS — K76.0 FATTY (CHANGE OF) LIVER, NOT ELSEWHERE CLASSIFIED: ICD-10-CM

## 2024-06-04 ENCOUNTER — APPOINTMENT (OUTPATIENT)
Dept: RADIATION ONCOLOGY | Facility: CLINIC | Age: 64
End: 2024-06-04
Payer: MEDICAID

## 2024-06-04 VITALS
TEMPERATURE: 97.9 F | WEIGHT: 231.8 LBS | DIASTOLIC BLOOD PRESSURE: 83 MMHG | BODY MASS INDEX: 37.41 KG/M2 | RESPIRATION RATE: 18 BRPM | SYSTOLIC BLOOD PRESSURE: 137 MMHG | OXYGEN SATURATION: 99 % | HEART RATE: 56 BPM

## 2024-06-04 DIAGNOSIS — I10 ESSENTIAL (PRIMARY) HYPERTENSION: ICD-10-CM

## 2024-06-04 DIAGNOSIS — G47.33 OBSTRUCTIVE SLEEP APNEA (ADULT) (PEDIATRIC): ICD-10-CM

## 2024-06-04 DIAGNOSIS — Z87.09 PERSONAL HISTORY OF OTHER DISEASES OF THE RESPIRATORY SYSTEM: ICD-10-CM

## 2024-06-04 PROCEDURE — 99204 OFFICE O/P NEW MOD 45 MIN: CPT

## 2024-06-04 RX ORDER — TRIAMTERENE AND HYDROCHLOROTHIAZIDE 25; 37.5 MG/1; MG/1
37.5-25 TABLET ORAL
Refills: 0 | Status: ACTIVE | COMMUNITY
Start: 2024-06-04

## 2024-06-04 NOTE — OB/GYN HISTORY
[Definite:  ___ (Date)] : the last menstrual period was [unfilled] [___] : Full Term: [unfilled] [History of Birth Control Pills] : Patient has no history of taking birth control pills [History of Hormone Replacement Therapy] : no history of hormone replacement therapy

## 2024-06-04 NOTE — HISTORY OF PRESENT ILLNESS
[FreeTextEntry1] : RFR: Fatty liver  Referring MD: Harjinder Ojeda MD   Ms. Garza is a 64 y female who presents today for initial evaluation of fatty liver. Was told that she has "fatty liver". Accompanied by her . PMHx of HTN, TAISHA, recently underwent hysterectomy for uterine carcinosarcoma.   - Hx of fluctuating weight  - Was aware of "fatty liver", referred to GI, then referred to Dr. Harjinder Ojeda who then referred to Dr. Saravia   - Had hepatitis w/u done  - Experienced postmenopausal bleeding in 2/2024, knew of fibroids. Underwent hysterectomy 3/19/24 w/ GYNONC, surgical path + stg 1 uterine CA. Underwent surveillance CT chest, neg. Is now undergoing chemo x 6 rounds & radiation x2 rounds  - Receives steroids post chemo  - Denies n/v/d, fevers, chills, excessive fatigue, chest pain, SOB, palpitations, lightheadedness/ dizziness, melena, unintentional weight loss, acute hospitalizations or ER visits. Afebrile today. Appetite is good.   [Other Medical Hx] See HPI   [Surgical Hx] Hysterectomy 3/2024    [Allergies] NKDA   [Medications] Vitamin B6, dexamethasone (post chemo), triamterene hctz    [FmH of liver disease] Denies  [FmH] HTN, glaucoma, DM    [Social Hx] - Alcohol: Denies - Tobacco: Denies  - Illicit drug: Impending medical marijuana      - Herb and dietary Supplement: Denies   [Labs] Last done 4/15/24   [Imaging] N/A  - Fibroscan:  - US Abd: - MRI/ MRCP   [Procedures] EGD: Overdue  Colonoscopy: Overdue

## 2024-06-04 NOTE — PHYSICAL EXAM
[General Appearance - Alert] : alert [General Appearance - In No Acute Distress] : in no acute distress [General Appearance - Well Nourished] : well nourished [] : no respiratory distress [Respiration, Rhythm And Depth] : normal respiratory rhythm and effort [Exaggerated Use Of Accessory Muscles For Inspiration] : no accessory muscle use [Auscultation Breath Sounds / Voice Sounds] : lungs were clear to auscultation bilaterally [Apical Impulse] : the apical impulse was normal [Heart Rate And Rhythm] : heart rate was normal and rhythm regular [Heart Sounds] : normal S1 and S2 [Edema] : there was no peripheral edema [Abdomen Soft] : soft [Bowel Sounds] : normal bowel sounds [Abnormal Walk] : normal gait [Musculoskeletal - Swelling] : no joint swelling seen [No Focal Deficits] : no focal deficits [Oriented To Time, Place, And Person] : oriented to person, place, and time [Impaired Insight] : insight and judgment were intact [Scleral Icterus] : No Scleral Icterus [Spider Angioma] : No spider angioma(s) were observed [Abdominal Bruit] : no abdominal bruit [Abdominal  Ascites] : no ascites [Ascites Fluid Wave] : no ascites fluid wave [Asterixis] : no asterixis observed [Jaundice] : No jaundice [Palmar Erythema] : no Palmar Erythema

## 2024-06-04 NOTE — ASSESSMENT
[FreeTextEntry1] : Ms. Garza is a 64 y female who presents today for initial evaluation of fatty liver, probable hepatic steatosis. LFTs, TB WNL as per CMP done 4/15.   - Reviewed patient specific daily diet and exercise routine. Provided patient specific feedback on ways to improve/ modify diet to achieve optimal liver health outcomes. Counseled on weight/ diet/ exercise practices. Educated that target weight loss goal would be 7-10% of total body weight w/ focus of building muscle on thigh/ gluteal regions. Shared healthy food options shared such as the Mediterranean diet, decreasing bread consumption and following consistent carbohydrate diet. Collaborative w/ patient to establish target weight loss goal of 16 Ib by time of next visit. Encouraged weight bearing exercises along w/ daily exercise for a minimum of 30 minutes.  - Ok to start wegovy as per hepatology, managed by endocrine/ PCP  - Ok to take tylenol up to max of 2g/day for pain control   [Plan]  - Will obtain fibroscan at next visit, educated to fast for 4 hours prior to next visit  - Labs to be done for comprehensive hepatology w/u prior to next RTO in 1 mo  Plans & discussions received well by patient. No outstanding questions or concerns at conclusion of visit. Patient was educated and informed on monitoring for s&s of worsening liver disease such as confusion, overt bleeding, vomiting blood, fluid buildup in abdomen (ascites), easy bruising, or fatigue. Encouraged to present to ER in the event of the above occurring and to call the office to notify. Patient provided the phone number for office to address questions/ concerns that may arise in the interim and instructed on how to contact language appropriate staff member.

## 2024-06-04 NOTE — HISTORY OF PRESENT ILLNESS
[FreeTextEntry1] : Aruna Garza is a 63 y/o F with a PMH of hypertension, pre-diabetes, vertigo, fatty liver, TAISHA, referred by Dr Hartman for consideration of radiation therapy for newly diagnosed stage 1A uterine carcinosarcoma 3/2024. She is s/p TLH, BSO, bilateral SLN dx, omentectomy 3/18/24 Dr. De Oliveira.    She had postmenopausal spotting 3 years ago, her GYN performed EMB which showed insufficient tissue. She was closely monitored, and the bleeding stopped. She then started feeling leakage of urine, w/ tinge of blood in 2024, which led her to go to urgent care who treated her for a UTI. Her PCP told her to see a GYN, who referred her for pelvic ultrasound. She ultimately saw Dr. Sharp who referred her to Dr. De Oliveira. She started having heavy vaginal bleeding 3/2024 so she went to the ED. She was admitted, and Dr. De Oliveira performed total abdominal hysterectomy, BSO, bilateral SLN, omentectomy on 3/18/24 (frozen section sent showing high grade carcinoma). Patient discussed at multidisciplinary tumor board on 24. Recommendation was for T/C (carboplatin/paclitaxel) x 6 cycles with vaginal brachytherapy. Genetic testing - results NEGATIVE Dr. Hartman - Chemo started 24 - C2  next .  Chest CT 24 wnl   24 - Consult She comes today with her , she offers no complaints. She is currently getting chemo at the moment and is feeling the effects. She states she has had some changes in her bowel pattern, she states she is fine today. She states she is quite tired and is concerned about RT increasing her fatigue. Patient educated about RT and its side effects. All questions answered. Prior Chemo: No Prior RT: No Pacemaker: No Menarche: age 14 LMP: Age 54 years   Birth Control: NO HRT: NO      2024 - ULTRASOUND ABDOMEN, RETROPERITONEAL AND PELVIS  FINDINGS:  Abdominal/renal sonogram:  Echogenic liver suspicious for hepatic steatosis.  Right kidney measures 12.5 x 5.7 x 5.7 cm. Caliectasis right kidney with no obvious solid mass or renal stone.  Left kidney measures 12.2 x 5.7 x 5.7 cm. A prominent column of Eb is suspected within the mid pole with no obvious calcification or obstruction.  Transabdominal pelvic sonogram:  Anteverted uterus 16.8 x 7.8 x 11.1 cm with a volume of 762cc. Endometrial echo appears 3 markedly thickened measuring 4.6 cm. Differential diagnosis should include endometrial carcinoma or a giant polyp. Echogenic lesion within the endometrial cavity measures approximately 7.4cm craniocaudal by 3.7 cm AP. Small amount of fluid within the canal is also identified. Tissue sampling is strongly suggested.  Both ovaries are not seen transabdominally. Uterine fibroids are present described transvaginally.    TRANSVAGINAL SONOGRAPHY:  Uterus is anteverted measuring 10.6 x 7.0 x 9.3 cm with a volume of 361 cc. Endometrial echo measures approximately 5mm. Uterine wall is heterogeneous containing fibroids including:  Subserosal fundal fibroid 8.2 x 8.7 x 8.9 cm.  Anterior mid subserosal fibroid 5.3 x 4.4 x 5.2 cm.   IMPRESSION:   Question giant endometrial polyp versus abnormal endometrial echo. Tissue sampling is strongly suggested. Alternatively pelvic MRI may be helpful.  Leiomyomatous uterus.  Ovaries are not visualized.  Mild caliectasis right kidney.  Prominent column of Eb left kidney.     3/18/2024 - Surgical Pathology Report - Auth (Verified)  Specimen(s) Submitted   1. Uterus, cervix, bilateral tubes and ovaries and endometrial mass   2. Left pelvic lymph node   3. Right pelvic lymph node   4. Omentum  Final Diagnosis   1. Uterus, cervix, bilateral tubes and ovaries and endometrial mass, resection:   - Carcinosarcoma, 9 cm in greatest dimension, exophytic and friable detached mass with focal residual carcinoma component limited to the endometrium pT1a pN0 (please see the comment and synoptic report below)   - No myometrial invasion seen   - All resection margins are negative for tumor   - No lymphovascular invasion seen   - Multiple leiomyoms, intramural and subserosal   - Bilateral tubes and ovaries sections with no significant pathologic findings   - Cervix sections with no significant pathologic findings   2. Left pelvic lymph node, excision:   - One lymph node, negative for metastatic tumor (0/1)   3. Right pelvic lymph node, excision:   - Six lymph node, negative for metastatic tumor (0/6)   4. Omentum, excision:   - Fibroadipose tissue with reactive changes and focal chronic inflammation, negative for metastatic tumor   Synoptic Summary   1: Endometrium   - Hysterectomy   Specimen   Procedure: Total hysterectomy and bilateral salpingo-oophorectomy; Omentectomy   Tumor   Histologic Type: Carcinosarcoma   Myometrial Invasion: Not identified   Uterine Serosa Involvement: Not identified   Lower Uterine Segment Involvement: Not identified  Cervical Stromal Involvement: Not identified   Other Tissue / Organ Involvement: Not identified   Lymphatic and / or Vascular Invasion: Not identified   Margins Margin Status: All margins negative for invasive carcinoma   Regional Lymph Nodes   Regional Lymph Node Status: All regional lymph nodes negative for tumor cells   Lymph Nodes Examined   Total Number of Pelvic Nodes Examined: 7   Total Number of Para-aortic Nodes Examined: Cannot be determined   - Not submitted   pTNM Classification (AJCC 8th Edition)   pT Category: pT1a   pN Category: pN0   Best Tumor Blocks for Future Studies   Tumor Block(s): Block 1Q has good neoplastic content for further studies if clinically indicated.   1. Uterus, cervix, bilateral tubes and ovaries and endometrial mass, resection, ( 16-AI- 1L-1R) 9 slides:   Slides 1L-1M (designated as leiomyoma in the gross description):   - Leiomyoma(s), in part cellular with scattered foci of bizarre nuclei   Note: Although, increased cellularity is present in some sections, only mild cytological atypia and low mitotic count (less than 1 per 10 high power fields) are noted, without coagulative type of necrosis, findings supportive of the diagnosis.   Slides 1N-1R (designated as endometrial mass in the gross description): - Carcinosarcoma, heterologous type (with chondrosarcomatous differentiation)   Note: The tumor cells show aberrant p53 expression (mutated phennotype) and are positive for p16 (diffuse and strong staining), findings supportive of the diagnosis.    Surgical Pathology Consultation Report - Auth (Verified) Specimen(s) Submitted Cervix Final Diagnosis   1. Uterus, cervix, bilateral tubes and ovaries and endometrial mass, resection, ( 27-QO- 1L-1R) 9 slides:   Slides 1L-1M (designated as leiomyoma in the gross description):   - Leiomyoma(s), in part cellular with scattered foci of bizarre nuclei   Note: Although, increased cellularity is present in some sections, only mild cytological atypia and low mitotic count (less than 1 per 10 high power fields) are noted, without coagulative type of necrosis, findings supportive of the diagnosis.   Slides 1N-1R (designated as endometrial mass in the gross description): - Carcinosarcoma, heterologous type (with chondrosarcomatous differentiation)   Note: The tumor cells show aberrant p53 expression (mutated phennotype) and are positive for p16 (diffuse and strong staining), findings supportive of the diagnosis.   2024 - CT CHEST IMPRESSION: Negative for pulmonary metastasis.

## 2024-06-04 NOTE — VITALS
[Maximal Pain Intensity: 0/10] : 0/10 [NoTreatment Scheduled] : no treatment scheduled [90: Able to carry normal activity; minor signs or symptoms of disease.] : 90: Able to carry normal activity; minor signs or symptoms of disease.  [90: Minor restrictions in physically strenous activity.] : 90: Minor restrictions in physically strenuous activity. [ECOG Performance Status: 1 - Restricted in physically strenuous activity but ambulatory and able to carry out work of a light or sedentary nature] : Performance Status: 1 - Restricted in physically strenuous activity but ambulatory and able to carry out work of a light or sedentary nature, e.g., light house work, office work [7 - Distress Level] : Distress Level: 7

## 2024-06-04 NOTE — REVIEW OF SYSTEMS
[Fever] : no fever [Chills] : no chills [Heart Rate Is Slow] : the heart rate was not slow [Heart Rate Is Fast] : the heart rate was not fast [Chest Pain] : no chest pain [Palpitations] : no palpitations [Lower Ext Edema] : no lower extremity edema [Shortness Of Breath] : no shortness of breath [Wheezing] : no wheezing [Cough] : no cough [SOB on Exertion] : no shortness of breath during exertion [Abdominal Pain] : no abdominal pain [Vomiting] : no vomiting [Constipation] : no constipation [Diarrhea] : no diarrhea [Melena] : no melena [Skin Lesions] : no skin lesions [Itching] : no itching [Confused] : no confusion [Convulsions] : no convulsions [Dizziness] : no dizziness [FreeTextEntry2] : fatigued from chemo [FreeTextEntry8] : hx of abnormal vaginal bleeding now s/p hysterectomy

## 2024-06-05 ENCOUNTER — APPOINTMENT (OUTPATIENT)
Dept: HEMATOLOGY ONCOLOGY | Facility: CLINIC | Age: 64
End: 2024-06-05
Payer: MEDICAID

## 2024-06-05 PROCEDURE — 90834 PSYTX W PT 45 MINUTES: CPT | Mod: 95

## 2024-06-11 ENCOUNTER — NON-APPOINTMENT (OUTPATIENT)
Age: 64
End: 2024-06-11

## 2024-06-12 ENCOUNTER — APPOINTMENT (OUTPATIENT)
Dept: HEMATOLOGY ONCOLOGY | Facility: CLINIC | Age: 64
End: 2024-06-12
Payer: MEDICAID

## 2024-06-12 PROCEDURE — 90837 PSYTX W PT 60 MINUTES: CPT | Mod: 95

## 2024-06-13 ENCOUNTER — APPOINTMENT (OUTPATIENT)
Dept: GYNECOLOGIC ONCOLOGY | Facility: CLINIC | Age: 64
End: 2024-06-13
Payer: MEDICAID

## 2024-06-13 VITALS
HEART RATE: 75 BPM | OXYGEN SATURATION: 100 % | BODY MASS INDEX: 37.61 KG/M2 | TEMPERATURE: 97.5 F | WEIGHT: 234 LBS | HEIGHT: 66 IN | SYSTOLIC BLOOD PRESSURE: 128 MMHG | DIASTOLIC BLOOD PRESSURE: 82 MMHG

## 2024-06-13 DIAGNOSIS — C80.1 MALIGNANT (PRIMARY) NEOPLASM, UNSPECIFIED: ICD-10-CM

## 2024-06-13 PROCEDURE — 99214 OFFICE O/P EST MOD 30 MIN: CPT

## 2024-06-14 LAB
ALBUMIN SERPL ELPH-MCNC: 4.2 G/DL
ALP BLD-CCNC: 81 U/L
ALT SERPL-CCNC: 72 U/L
ANION GAP SERPL CALC-SCNC: 15 MMOL/L
AST SERPL-CCNC: 36 U/L
BASOPHILS # BLD AUTO: 0.04 K/UL
BASOPHILS NFR BLD AUTO: 1.3 %
BILIRUB SERPL-MCNC: <0.2 MG/DL
BUN SERPL-MCNC: 12 MG/DL
CALCIUM SERPL-MCNC: 9.3 MG/DL
CHLORIDE SERPL-SCNC: 98 MMOL/L
CO2 SERPL-SCNC: 26 MMOL/L
CREAT SERPL-MCNC: 0.68 MG/DL
EGFR: 98 ML/MIN/1.73M2
EOSINOPHIL # BLD AUTO: 0.01 K/UL
EOSINOPHIL NFR BLD AUTO: 0.3 %
HCT VFR BLD CALC: 33.5 %
HGB BLD-MCNC: 10.5 G/DL
IMM GRANULOCYTES NFR BLD AUTO: 1.6 %
LYMPHOCYTES # BLD AUTO: 1.43 K/UL
LYMPHOCYTES NFR BLD AUTO: 46.1 %
MAGNESIUM SERPL-MCNC: 2 MG/DL
MAN DIFF?: NORMAL
MCHC RBC-ENTMCNC: 28.1 PG
MCHC RBC-ENTMCNC: 31.3 GM/DL
MCV RBC AUTO: 89.6 FL
MONOCYTES # BLD AUTO: 0.52 K/UL
MONOCYTES NFR BLD AUTO: 16.8 %
NEUTROPHILS # BLD AUTO: 1.05 K/UL
NEUTROPHILS NFR BLD AUTO: 33.9 %
PLATELET # BLD AUTO: 156 K/UL
POTASSIUM SERPL-SCNC: 3.4 MMOL/L
PROT SERPL-MCNC: 7 G/DL
RBC # BLD: 3.74 M/UL
RBC # FLD: 14.9 %
SODIUM SERPL-SCNC: 140 MMOL/L
WBC # FLD AUTO: 3.1 K/UL

## 2024-06-14 NOTE — DISCUSSION/SUMMARY
[Reviewed Clinical Lab Test(s)] : Results of clinical tests were reviewed. [Discuss Tests w/Referring Providers] : Results of labs/radiology studies and the treatment recommendations were discussed with performing/referring physician. [Discuss Alternatives/Risks/Benefits w/Patient] : All alternatives, risks, and benefits were discussed with the patient/family and all questions were answered.  Patient expressed good understanding and appreciates the importance of follow up as recommended. [Diagnosis/Stage ___] : Given this data, a diagnosis of [unfilled] is rendered. [FreeTextEntry1] : 63 yo s/p staging surgery for 1A uterine carcinosarcoma on adjuvant T/C   []C3 T/C 6/19 []pre chemo labs drawn []chemotherapy calendar given - decrease steroids  []Long discussion regarding chemotherapy side effects. Patient very concerned about compounding of side effects.  Follow up prior to C4 with Dr. Hartman

## 2024-06-14 NOTE — PHYSICAL EXAM
[Chaperone Present] : A chaperone was present in the examining room during all aspects of the physical examination [Normal] : Palpation of skin and subcutaneous tissue: Normal turgor [Fully active, able to carry on all pre-disease performance without restriction] : Status 0 - Fully active, able to carry on all pre-disease performance without restriction [de-identified] : large midline vertical

## 2024-06-14 NOTE — HISTORY OF PRESENT ILLNESS
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 The uterine canal is lobulated and markedly distended, possibly with blood products. Multiple bulky fibroids. Difficult to distinguish endometrial layer from intracanal contents. Findings are concerning for a neoplastic process. Gynecological consultation and tissue sampling recommended. 2. Nonspecific prominent lymph nodes, catalogued above. 3. Questionable left mid interpolar renal lesion versus loculated normal renal parenchyma. Can correlate with with outpatient MRI with intravenous contrast for further evaluation.  1) Hysterectomy, BSO, bilateral SLN, omentectomy 3/18/24 with Dr. De Oliveira     1. Uterus, cervix, bilateral tubes and ovaries and endometrial mass, resection: - Carcinosarcoma, 9 cm in greatest dimension, exophytic and friable detached mass with focal residual carcinoma component limited to the endometrium pT1a pN0 (please see the comment and synoptic report below) - No myometrial invasion seen - All resection margins are negative for tumor - No lymphovascular invasion seen - Multiple leiomyoms, intramural and subserosal - Bilateral tubes and ovaries sections with no significant pathologic findings - Cervix sections with no significant pathologic findings  2. Left pelvic lymph node, excision: - One lymph node, negative for metastatic tumor (0/1)  3. Right pelvic lymph node, excision: - Six lymph node, negative for metastatic tumor (0/6)  4. Omentum, excision: - Fibroadipose tissue with reactive changes and focal chronic inflammation, negative for metastatic tumor 6) Chest CT 4/18/24 wnl  7) Taxol 175mg/m2 + Carbo AUC 6 initiated 5/8/24  Here for follow up prior to C3 Taxol/Carbo 6/19. Not feeling well. Intermittent neuropathy of the feet feels like her body is vibrating internally. Vertigo is also flaring, started PT for it two weeks ago. Energy and brain fog are better. Eating and drinking is good, has not appetite but eats on a schedule every 2 hours. Drinks water, seltzer juices but probably not enough. Had some episodes of feeling faint but related to not eating or drinking well. Took miralax once a day for week of chemotherapy and did not have diarrhea but had frequent bowel movements. Insomnia is much better taking Tylenol for pain before bed. Has not had to take Lorazepam.   OBhx: CSx2, SAB Gynhx: Remote hx of abnormal pap smear (normal since then), hx fibroids. Menopause since age 54y.  PMH: asthma as a child, HTN, pre-diabetes, vertigo (saw neurologist and ENT), TAISHA PSH: CSx2, open cholecystectomy 1988, JIMI, BSO, bilateral SLN, omentectomy on 3/18/24 meds: triemterine 37.5 qd, meclizine prn, CPAP, nasal spray prn social: denies Fhx: Father w/ prostate ca (dx at 70's yo), 2 maternal aunts w/ breast ca (dx in their 50's), first cousin (maternal) w/ breast cancer (dx 39 yo). No genetic testing performed. Cousin w/ sarcoidosis  Last pap smear: last pap smear 2023 (normal) mammogram: 2023 wnl colonoscopy: last c-scope 10 years ago (wnl) cologuard 2022 wnl

## 2024-06-17 RX ORDER — DIPHENHYDRAMINE HCL 12.5MG/5ML
25 ELIXIR ORAL ONCE
Refills: 0 | Status: COMPLETED | OUTPATIENT
Start: 2024-06-19 | End: 2024-06-19

## 2024-06-17 RX ORDER — FOSAPREPITANT DIMEGLUMINE 150 MG/5ML
150 INJECTION, POWDER, LYOPHILIZED, FOR SOLUTION INTRAVENOUS ONCE
Refills: 0 | Status: COMPLETED | OUTPATIENT
Start: 2024-06-19 | End: 2024-06-19

## 2024-06-17 RX ORDER — DEXAMETHASONE 1 MG/1
12 TABLET ORAL ONCE
Refills: 0 | Status: COMPLETED | OUTPATIENT
Start: 2024-06-19 | End: 2024-06-19

## 2024-06-17 RX ORDER — FAMOTIDINE 40 MG
20 TABLET ORAL ONCE
Refills: 0 | Status: COMPLETED | OUTPATIENT
Start: 2024-06-19 | End: 2024-06-19

## 2024-06-17 RX ORDER — PALONOSETRON HYDROCHLORIDE 0.25 MG/5ML
0.25 INJECTION, SOLUTION INTRAVENOUS ONCE
Refills: 0 | Status: COMPLETED | OUTPATIENT
Start: 2024-06-19 | End: 2024-06-19

## 2024-06-18 ENCOUNTER — APPOINTMENT (OUTPATIENT)
Dept: HEPATOLOGY | Facility: CLINIC | Age: 64
End: 2024-06-18

## 2024-06-19 ENCOUNTER — APPOINTMENT (OUTPATIENT)
Dept: INFUSION THERAPY | Facility: CLINIC | Age: 64
End: 2024-06-19

## 2024-06-19 ENCOUNTER — APPOINTMENT (OUTPATIENT)
Dept: HEMATOLOGY ONCOLOGY | Facility: CLINIC | Age: 64
End: 2024-06-19

## 2024-06-19 ENCOUNTER — OUTPATIENT (OUTPATIENT)
Dept: OUTPATIENT SERVICES | Facility: HOSPITAL | Age: 64
LOS: 1 days | End: 2024-06-19
Payer: MEDICAID

## 2024-06-19 VITALS
HEART RATE: 78 BPM | SYSTOLIC BLOOD PRESSURE: 114 MMHG | DIASTOLIC BLOOD PRESSURE: 74 MMHG | WEIGHT: 238.1 LBS | RESPIRATION RATE: 18 BRPM | TEMPERATURE: 98 F | HEIGHT: 66 IN | OXYGEN SATURATION: 99 %

## 2024-06-19 DIAGNOSIS — C55 MALIGNANT NEOPLASM OF UTERUS, PART UNSPECIFIED: ICD-10-CM

## 2024-06-19 DIAGNOSIS — Z98.891 HISTORY OF UTERINE SCAR FROM PREVIOUS SURGERY: Chronic | ICD-10-CM

## 2024-06-19 LAB
ALBUMIN SERPL ELPH-MCNC: 3.4 G/DL — SIGNIFICANT CHANGE UP (ref 3.3–5)
ALP SERPL-CCNC: 72 U/L — SIGNIFICANT CHANGE UP (ref 40–120)
ALT FLD-CCNC: 50 U/L — HIGH (ref 10–45)
ANION GAP SERPL CALC-SCNC: 10 MMOL/L — SIGNIFICANT CHANGE UP (ref 5–17)
AST SERPL-CCNC: 41 U/L — HIGH (ref 10–40)
BILIRUB SERPL-MCNC: 0.5 MG/DL — SIGNIFICANT CHANGE UP (ref 0.2–1.2)
BUN SERPL-MCNC: 19 MG/DL — SIGNIFICANT CHANGE UP (ref 7–23)
CALCIUM SERPL-MCNC: 9.4 MG/DL — SIGNIFICANT CHANGE UP (ref 8.4–10.5)
CHLORIDE SERPL-SCNC: 105 MMOL/L — SIGNIFICANT CHANGE UP (ref 96–108)
CO2 SERPL-SCNC: 28 MMOL/L — SIGNIFICANT CHANGE UP (ref 22–31)
CREAT SERPL-MCNC: 0.5 MG/DL — SIGNIFICANT CHANGE UP (ref 0.5–1.3)
EGFR: 105 ML/MIN/1.73M2 — SIGNIFICANT CHANGE UP
GLUCOSE SERPL-MCNC: 176 MG/DL — HIGH (ref 70–99)
HCT VFR BLD CALC: 32.5 % — LOW (ref 34.5–45)
HGB BLD-MCNC: 10.3 G/DL — LOW (ref 11.5–15.5)
LYMPHOCYTES # BLD AUTO: 1.3 K/UL — SIGNIFICANT CHANGE UP (ref 1–3.3)
LYMPHOCYTES # BLD AUTO: 29 % — SIGNIFICANT CHANGE UP (ref 13–44)
MCHC RBC-ENTMCNC: 28.5 PG — SIGNIFICANT CHANGE UP (ref 27–34)
MCHC RBC-ENTMCNC: 31.7 GM/DL — LOW (ref 32–36)
MCV RBC AUTO: 89.8 FL — SIGNIFICANT CHANGE UP (ref 80–100)
NEUTROPHILS # BLD AUTO: 2.9 K/UL — SIGNIFICANT CHANGE UP (ref 1.8–7.4)
NEUTROPHILS NFR BLD AUTO: 61.7 % — SIGNIFICANT CHANGE UP (ref 43–77)
PLATELET # BLD AUTO: 134 K/UL — LOW (ref 150–400)
POTASSIUM SERPL-MCNC: 3.4 MMOL/L — LOW (ref 3.5–5.3)
POTASSIUM SERPL-SCNC: 3.4 MMOL/L — LOW (ref 3.5–5.3)
PROT SERPL-MCNC: 7.2 G/DL — SIGNIFICANT CHANGE UP (ref 6–8.3)
RBC # BLD: 3.62 M/UL — LOW (ref 3.8–5.2)
RBC # FLD: 15 % — HIGH (ref 10.3–14.5)
SODIUM SERPL-SCNC: 143 MMOL/L — SIGNIFICANT CHANGE UP (ref 135–145)
WBC # BLD: 4.6 K/UL — SIGNIFICANT CHANGE UP (ref 3.8–10.5)
WBC # FLD AUTO: 4.6 K/UL — SIGNIFICANT CHANGE UP (ref 3.8–10.5)

## 2024-06-19 PROCEDURE — 36415 COLL VENOUS BLD VENIPUNCTURE: CPT

## 2024-06-19 PROCEDURE — 85025 COMPLETE CBC W/AUTO DIFF WBC: CPT

## 2024-06-19 PROCEDURE — 96367 TX/PROPH/DG ADDL SEQ IV INF: CPT

## 2024-06-19 PROCEDURE — 96417 CHEMO IV INFUS EACH ADDL SEQ: CPT

## 2024-06-19 PROCEDURE — 96413 CHEMO IV INFUSION 1 HR: CPT

## 2024-06-19 PROCEDURE — 96375 TX/PRO/DX INJ NEW DRUG ADDON: CPT

## 2024-06-19 PROCEDURE — 80053 COMPREHEN METABOLIC PANEL: CPT

## 2024-06-19 PROCEDURE — 96415 CHEMO IV INFUSION ADDL HR: CPT

## 2024-06-19 RX ORDER — POTASSIUM CHLORIDE 600 MG/1
20 TABLET, FILM COATED, EXTENDED RELEASE ORAL ONCE
Refills: 0 | Status: COMPLETED | OUTPATIENT
Start: 2024-06-19 | End: 2024-06-19

## 2024-06-19 RX ORDER — CARBOPLATIN 10 MG/ML
889 INJECTION INTRAVENOUS ONCE
Refills: 0 | Status: COMPLETED | OUTPATIENT
Start: 2024-06-19 | End: 2024-06-19

## 2024-06-19 RX ORDER — PACLITAXEL 30 MG/5ML
350 INJECTION, SOLUTION INTRAVENOUS ONCE
Refills: 0 | Status: COMPLETED | OUTPATIENT
Start: 2024-06-19 | End: 2024-06-19

## 2024-06-19 RX ADMIN — Medication 25 MILLIGRAM(S): at 09:40

## 2024-06-19 RX ADMIN — Medication 202 MILLIGRAM(S): at 09:25

## 2024-06-19 RX ADMIN — POTASSIUM CHLORIDE 20 MILLIEQUIVALENT(S): 600 TABLET, FILM COATED, EXTENDED RELEASE ORAL at 10:39

## 2024-06-19 RX ADMIN — DEXAMETHASONE 12 MILLIGRAM(S): 1 TABLET ORAL at 09:25

## 2024-06-19 RX ADMIN — PALONOSETRON HYDROCHLORIDE 0.25 MILLIGRAM(S): 0.25 INJECTION, SOLUTION INTRAVENOUS at 10:30

## 2024-06-19 RX ADMIN — Medication 20 MILLIGRAM(S): at 09:45

## 2024-06-19 RX ADMIN — CARBOPLATIN 889 MILLIGRAM(S): 10 INJECTION INTRAVENOUS at 16:30

## 2024-06-19 RX ADMIN — CARBOPLATIN 889 MILLIGRAM(S): 10 INJECTION INTRAVENOUS at 15:40

## 2024-06-19 RX ADMIN — PACLITAXEL 350 MILLIGRAM(S): 30 INJECTION, SOLUTION INTRAVENOUS at 15:40

## 2024-06-19 RX ADMIN — FOSAPREPITANT DIMEGLUMINE 150 MILLIGRAM(S): 150 INJECTION, POWDER, LYOPHILIZED, FOR SOLUTION INTRAVENOUS at 10:20

## 2024-06-19 RX ADMIN — FOSAPREPITANT DIMEGLUMINE 500 MILLIGRAM(S): 150 INJECTION, POWDER, LYOPHILIZED, FOR SOLUTION INTRAVENOUS at 09:50

## 2024-06-19 RX ADMIN — PACLITAXEL 350 MILLIGRAM(S): 30 INJECTION, SOLUTION INTRAVENOUS at 11:17

## 2024-06-19 RX ADMIN — DEXAMETHASONE 212 MILLIGRAM(S): 1 TABLET ORAL at 09:10

## 2024-06-26 ENCOUNTER — APPOINTMENT (OUTPATIENT)
Dept: HEMATOLOGY ONCOLOGY | Facility: CLINIC | Age: 64
End: 2024-06-26
Payer: MEDICAID

## 2024-06-26 PROCEDURE — 90837 PSYTX W PT 60 MINUTES: CPT | Mod: 95

## 2024-07-01 ENCOUNTER — NON-APPOINTMENT (OUTPATIENT)
Age: 64
End: 2024-07-01

## 2024-07-02 ENCOUNTER — APPOINTMENT (OUTPATIENT)
Dept: HEMATOLOGY ONCOLOGY | Facility: CLINIC | Age: 64
End: 2024-07-02
Payer: MEDICAID

## 2024-07-02 PROBLEM — F43.23 ACUTE ADJUSTMENT DISORDER WITH MIXED ANXIETY AND DEPRESSED MOOD: Status: ACTIVE | Noted: 2024-05-22

## 2024-07-02 PROCEDURE — 90837 PSYTX W PT 60 MINUTES: CPT | Mod: 95

## 2024-07-08 ENCOUNTER — LABORATORY RESULT (OUTPATIENT)
Age: 64
End: 2024-07-08

## 2024-07-08 ENCOUNTER — NON-APPOINTMENT (OUTPATIENT)
Age: 64
End: 2024-07-08

## 2024-07-08 ENCOUNTER — APPOINTMENT (OUTPATIENT)
Dept: GYNECOLOGIC ONCOLOGY | Facility: CLINIC | Age: 64
End: 2024-07-08
Payer: MEDICAID

## 2024-07-08 VITALS
OXYGEN SATURATION: 98 % | DIASTOLIC BLOOD PRESSURE: 80 MMHG | WEIGHT: 236 LBS | SYSTOLIC BLOOD PRESSURE: 121 MMHG | BODY MASS INDEX: 37.93 KG/M2 | HEART RATE: 95 BPM | TEMPERATURE: 97.2 F | HEIGHT: 66 IN

## 2024-07-08 PROCEDURE — 99213 OFFICE O/P EST LOW 20 MIN: CPT

## 2024-07-08 PROCEDURE — 99459 PELVIC EXAMINATION: CPT

## 2024-07-09 LAB
ALBUMIN SERPL ELPH-MCNC: 4.3 G/DL
ALP BLD-CCNC: 69 U/L
ALT SERPL-CCNC: 47 U/L
ANION GAP SERPL CALC-SCNC: 16 MMOL/L
AST SERPL-CCNC: 37 U/L
BILIRUB SERPL-MCNC: 0.2 MG/DL
CALCIUM SERPL-MCNC: 9.5 MG/DL
CHLORIDE SERPL-SCNC: 101 MMOL/L
CO2 SERPL-SCNC: 25 MMOL/L
CREAT SERPL-MCNC: 0.57 MG/DL
MAGNESIUM SERPL-MCNC: 1.8 MG/DL
POTASSIUM SERPL-SCNC: 3.5 MMOL/L
PROT SERPL-MCNC: 7 G/DL
SODIUM SERPL-SCNC: 142 MMOL/L

## 2024-07-09 RX ORDER — PACLITAXEL 30 MG/5ML
350 INJECTION, SOLUTION INTRAVENOUS ONCE
Refills: 0 | Status: COMPLETED | OUTPATIENT
Start: 2024-07-10 | End: 2024-07-10

## 2024-07-09 RX ORDER — CARBOPLATIN 10 MG/ML
889 INJECTION INTRAVENOUS ONCE
Refills: 0 | Status: COMPLETED | OUTPATIENT
Start: 2024-07-10 | End: 2024-07-10

## 2024-07-10 ENCOUNTER — OUTPATIENT (OUTPATIENT)
Dept: OUTPATIENT SERVICES | Facility: HOSPITAL | Age: 64
LOS: 1 days | End: 2024-07-10
Payer: MEDICAID

## 2024-07-10 ENCOUNTER — APPOINTMENT (OUTPATIENT)
Dept: INFUSION THERAPY | Facility: CLINIC | Age: 64
End: 2024-07-10

## 2024-07-10 VITALS
OXYGEN SATURATION: 99 % | TEMPERATURE: 98 F | DIASTOLIC BLOOD PRESSURE: 78 MMHG | HEART RATE: 88 BPM | SYSTOLIC BLOOD PRESSURE: 119 MMHG | RESPIRATION RATE: 18 BRPM

## 2024-07-10 VITALS
TEMPERATURE: 98 F | DIASTOLIC BLOOD PRESSURE: 81 MMHG | HEIGHT: 66 IN | SYSTOLIC BLOOD PRESSURE: 127 MMHG | RESPIRATION RATE: 18 BRPM | HEART RATE: 97 BPM | OXYGEN SATURATION: 99 % | WEIGHT: 134.92 LBS

## 2024-07-10 DIAGNOSIS — Z90.49 ACQUIRED ABSENCE OF OTHER SPECIFIED PARTS OF DIGESTIVE TRACT: Chronic | ICD-10-CM

## 2024-07-10 DIAGNOSIS — Z98.891 HISTORY OF UTERINE SCAR FROM PREVIOUS SURGERY: Chronic | ICD-10-CM

## 2024-07-10 DIAGNOSIS — C55 MALIGNANT NEOPLASM OF UTERUS, PART UNSPECIFIED: ICD-10-CM

## 2024-07-10 LAB
ANISOCYTOSIS BLD QL: SLIGHT — SIGNIFICANT CHANGE UP
BASOPHILS # BLD AUTO: 0.01 K/UL
BASOPHILS NFR BLD AUTO: 0.4 %
DACRYOCYTES BLD QL SMEAR: SLIGHT — SIGNIFICANT CHANGE UP
ELLIPTOCYTES BLD QL SMEAR: SLIGHT — SIGNIFICANT CHANGE UP
EOSINOPHIL # BLD AUTO: 0 K/UL
EOSINOPHIL NFR BLD AUTO: 0 %
ESTIMATED AVERAGE GLUCOSE: 189 MG/DL
HBA1C MFR BLD HPLC: 8.2 %
HCT VFR BLD CALC: 29.8 % — LOW (ref 34.5–45)
HCT VFR BLD CALC: 31.3 %
HGB BLD-MCNC: 9.5 G/DL — LOW (ref 11.5–15.5)
LYMPHOCYTES # BLD AUTO: 0.9 K/UL — LOW (ref 1–3.3)
LYMPHOCYTES # BLD AUTO: 1.26 K/UL
LYMPHOCYTES # BLD AUTO: 49 % — HIGH (ref 13–44)
LYMPHOCYTES NFR BLD AUTO: 49.6 %
MAN DIFF?: NORMAL
MANUAL SMEAR VERIFICATION: SIGNIFICANT CHANGE UP
MCHC RBC-ENTMCNC: 28.4 PG — SIGNIFICANT CHANGE UP (ref 27–34)
MCHC RBC-ENTMCNC: 28.9 PG
MCHC RBC-ENTMCNC: 31.9 GM/DL — LOW (ref 32–36)
MCV RBC AUTO: 89.2 FL — SIGNIFICANT CHANGE UP (ref 80–100)
MCV RBC AUTO: 92.3 FL
MONOCYTES # BLD AUTO: 0.1 K/UL — SIGNIFICANT CHANGE UP (ref 0–0.9)
MONOCYTES # BLD AUTO: 0.19 K/UL
MONOCYTES NFR BLD AUTO: 10 % — SIGNIFICANT CHANGE UP (ref 2–14)
MONOCYTES NFR BLD AUTO: 7.5 %
NEUTROPHILS # BLD AUTO: 0.7 K/UL — LOW (ref 1.8–7.4)
NEUTROPHILS NFR BLD AUTO: 41 % — LOW (ref 43–77)
NEUTROPHILS NFR BLD AUTO: 42.1 %
OVALOCYTES BLD QL SMEAR: SLIGHT — SIGNIFICANT CHANGE UP
PLAT MORPH BLD: ABNORMAL
PLATELET # BLD AUTO: 125 K/UL
PLATELET # BLD AUTO: 130 K/UL — LOW (ref 150–400)
POIKILOCYTOSIS BLD QL AUTO: SLIGHT — SIGNIFICANT CHANGE UP
RBC # BLD: 3.34 M/UL — LOW (ref 3.8–5.2)
RBC # BLD: 3.39 M/UL
RBC # FLD: 15.7 % — HIGH (ref 10.3–14.5)
RBC # FLD: 16.9 %
RBC BLD AUTO: ABNORMAL
SMUDGE CELLS # BLD: SIGNIFICANT CHANGE UP
WBC # BLD: 1.8 K/UL — LOW (ref 3.8–10.5)
WBC # FLD AUTO: 1.8 K/UL — LOW (ref 3.8–10.5)
WBC # FLD AUTO: 2.54 K/UL

## 2024-07-10 PROCEDURE — 96367 TX/PROPH/DG ADDL SEQ IV INF: CPT

## 2024-07-10 PROCEDURE — 96413 CHEMO IV INFUSION 1 HR: CPT

## 2024-07-10 PROCEDURE — 85025 COMPLETE CBC W/AUTO DIFF WBC: CPT

## 2024-07-10 PROCEDURE — 96372 THER/PROPH/DIAG INJ SC/IM: CPT

## 2024-07-10 PROCEDURE — 96415 CHEMO IV INFUSION ADDL HR: CPT

## 2024-07-10 PROCEDURE — 36415 COLL VENOUS BLD VENIPUNCTURE: CPT

## 2024-07-10 PROCEDURE — 96375 TX/PRO/DX INJ NEW DRUG ADDON: CPT

## 2024-07-10 PROCEDURE — 85007 BL SMEAR W/DIFF WBC COUNT: CPT

## 2024-07-10 RX ORDER — PALONOSETRON HYDROCHLORIDE 0.25 MG/5ML
0.25 INJECTION, SOLUTION INTRAVENOUS ONCE
Refills: 0 | Status: COMPLETED | OUTPATIENT
Start: 2024-07-10 | End: 2024-07-10

## 2024-07-10 RX ORDER — DEXAMETHASONE 1 MG/1
12 TABLET ORAL ONCE
Refills: 0 | Status: COMPLETED | OUTPATIENT
Start: 2024-07-10 | End: 2024-07-10

## 2024-07-10 RX ORDER — DIPHENHYDRAMINE HCL 12.5MG/5ML
25 ELIXIR ORAL ONCE
Refills: 0 | Status: COMPLETED | OUTPATIENT
Start: 2024-07-10 | End: 2024-07-10

## 2024-07-10 RX ORDER — PEGFILGRASTIM-CBQV 6 MG/.6ML
6 INJECTION, SOLUTION SUBCUTANEOUS ONCE
Refills: 0 | Status: COMPLETED | OUTPATIENT
Start: 2024-07-10 | End: 2024-07-10

## 2024-07-10 RX ORDER — FAMOTIDINE 40 MG
20 TABLET ORAL ONCE
Refills: 0 | Status: COMPLETED | OUTPATIENT
Start: 2024-07-10 | End: 2024-07-10

## 2024-07-10 RX ORDER — FOSAPREPITANT DIMEGLUMINE 150 MG/5ML
150 INJECTION, POWDER, LYOPHILIZED, FOR SOLUTION INTRAVENOUS ONCE
Refills: 0 | Status: COMPLETED | OUTPATIENT
Start: 2024-07-10 | End: 2024-07-10

## 2024-07-10 RX ADMIN — PEGFILGRASTIM-CBQV 6 MILLIGRAM(S): 6 INJECTION, SOLUTION SUBCUTANEOUS at 18:50

## 2024-07-10 RX ADMIN — DEXAMETHASONE 12 MILLIGRAM(S): 1 TABLET ORAL at 14:20

## 2024-07-10 RX ADMIN — PACLITAXEL 350 MILLIGRAM(S): 30 INJECTION, SOLUTION INTRAVENOUS at 15:16

## 2024-07-10 RX ADMIN — CARBOPLATIN 889 MILLIGRAM(S): 10 INJECTION INTRAVENOUS at 18:47

## 2024-07-10 RX ADMIN — FOSAPREPITANT DIMEGLUMINE 500 MILLIGRAM(S): 150 INJECTION, POWDER, LYOPHILIZED, FOR SOLUTION INTRAVENOUS at 14:45

## 2024-07-10 RX ADMIN — PACLITAXEL 350 MILLIGRAM(S): 30 INJECTION, SOLUTION INTRAVENOUS at 18:15

## 2024-07-10 RX ADMIN — Medication 25 MILLIGRAM(S): at 14:45

## 2024-07-10 RX ADMIN — PALONOSETRON HYDROCHLORIDE 0.25 MILLIGRAM(S): 0.25 INJECTION, SOLUTION INTRAVENOUS at 14:20

## 2024-07-10 RX ADMIN — DEXAMETHASONE 212 MILLIGRAM(S): 1 TABLET ORAL at 14:05

## 2024-07-10 RX ADMIN — Medication 20 MILLIGRAM(S): at 14:27

## 2024-07-10 RX ADMIN — CARBOPLATIN 889 MILLIGRAM(S): 10 INJECTION INTRAVENOUS at 18:16

## 2024-07-10 RX ADMIN — Medication 202 MILLIGRAM(S): at 14:30

## 2024-07-10 RX ADMIN — FOSAPREPITANT DIMEGLUMINE 150 MILLIGRAM(S): 150 INJECTION, POWDER, LYOPHILIZED, FOR SOLUTION INTRAVENOUS at 15:15

## 2024-07-10 NOTE — PHARMACY COMMUNICATION NOTE - COMMENTS
Patient's repeat ANC is 0.7. Pt. cleared for treatment by Jonnathan Pritchett with addition of Neulasta Onpro

## 2024-07-11 ENCOUNTER — APPOINTMENT (OUTPATIENT)
Dept: INFUSION THERAPY | Facility: CLINIC | Age: 64
End: 2024-07-11

## 2024-07-11 ENCOUNTER — OUTPATIENT (OUTPATIENT)
Dept: OUTPATIENT SERVICES | Facility: HOSPITAL | Age: 64
LOS: 1 days | End: 2024-07-11
Payer: MEDICAID

## 2024-07-11 ENCOUNTER — APPOINTMENT (OUTPATIENT)
Dept: HEMATOLOGY ONCOLOGY | Facility: CLINIC | Age: 64
End: 2024-07-11
Payer: MEDICAID

## 2024-07-11 VITALS
RESPIRATION RATE: 16 BRPM | HEART RATE: 85 BPM | SYSTOLIC BLOOD PRESSURE: 146 MMHG | OXYGEN SATURATION: 100 % | DIASTOLIC BLOOD PRESSURE: 83 MMHG | TEMPERATURE: 98 F

## 2024-07-11 DIAGNOSIS — C55 MALIGNANT NEOPLASM OF UTERUS, PART UNSPECIFIED: ICD-10-CM

## 2024-07-11 DIAGNOSIS — Z98.891 HISTORY OF UTERINE SCAR FROM PREVIOUS SURGERY: Chronic | ICD-10-CM

## 2024-07-11 PROCEDURE — 90837 PSYTX W PT 60 MINUTES: CPT | Mod: 93

## 2024-07-11 PROCEDURE — 96372 THER/PROPH/DIAG INJ SC/IM: CPT

## 2024-07-11 RX ORDER — PEGFILGRASTIM-CBQV 6 MG/.6ML
6 INJECTION, SOLUTION SUBCUTANEOUS ONCE
Refills: 0 | Status: COMPLETED | OUTPATIENT
Start: 2024-07-11 | End: 2024-07-11

## 2024-07-11 RX ADMIN — PEGFILGRASTIM-CBQV 6 MILLIGRAM(S): 6 INJECTION, SOLUTION SUBCUTANEOUS at 17:30

## 2024-07-15 ENCOUNTER — APPOINTMENT (OUTPATIENT)
Dept: HEMATOLOGY ONCOLOGY | Facility: CLINIC | Age: 64
End: 2024-07-15
Payer: MEDICAID

## 2024-07-15 ENCOUNTER — NON-APPOINTMENT (OUTPATIENT)
Age: 64
End: 2024-07-15

## 2024-07-15 PROCEDURE — 90837 PSYTX W PT 60 MINUTES: CPT | Mod: 95

## 2024-07-22 ENCOUNTER — APPOINTMENT (OUTPATIENT)
Dept: HEMATOLOGY ONCOLOGY | Facility: CLINIC | Age: 64
End: 2024-07-22
Payer: MEDICAID

## 2024-07-22 PROCEDURE — 90834 PSYTX W PT 45 MINUTES: CPT | Mod: 95

## 2024-07-24 ENCOUNTER — LABORATORY RESULT (OUTPATIENT)
Age: 64
End: 2024-07-24

## 2024-07-24 ENCOUNTER — APPOINTMENT (OUTPATIENT)
Dept: GYNECOLOGIC ONCOLOGY | Facility: CLINIC | Age: 64
End: 2024-07-24
Payer: MEDICAID

## 2024-07-24 VITALS
TEMPERATURE: 97.6 F | HEIGHT: 66 IN | HEART RATE: 95 BPM | WEIGHT: 235 LBS | BODY MASS INDEX: 37.77 KG/M2 | SYSTOLIC BLOOD PRESSURE: 130 MMHG | OXYGEN SATURATION: 100 % | DIASTOLIC BLOOD PRESSURE: 84 MMHG

## 2024-07-24 PROCEDURE — 99459 PELVIC EXAMINATION: CPT

## 2024-07-24 PROCEDURE — 99213 OFFICE O/P EST LOW 20 MIN: CPT

## 2024-07-24 NOTE — HISTORY OF PRESENT ILLNESS
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 The uterine canal is lobulated and markedly distended, possibly with blood products. Multiple bulky fibroids. Difficult to distinguish endometrial layer from intracanal contents. Findings are concerning for a neoplastic process. Gynecological consultation and tissue sampling recommended. 2. Nonspecific prominent lymph nodes, catalogued above. 3. Questionable left mid interpolar renal lesion versus loculated normal renal parenchyma. Can correlate with with outpatient MRI with intravenous contrast for further evaluation. 3) Hysterectomy, BSO, bilateral SLN, omentectomy 3/18/24 with Dr. De Oliveira     1. Uterus, cervix, bilateral tubes and ovaries and endometrial mass, resection: - Carcinosarcoma, 9 cm in greatest dimension, exophytic and friable detached mass with focal residual carcinoma component limited to the endometrium pT1a pN0 (please see the comment and synoptic report below) - No myometrial invasion seen - All resection margins are negative for tumor - No lymphovascular invasion seen - Multiple leiomyoms, intramural and subserosal - Bilateral tubes and ovaries sections with no significant pathologic findings - Cervix sections with no significant pathologic findings  2. Left pelvic lymph node, excision: - One lymph node, negative for metastatic tumor (0/1)  3. Right pelvic lymph node, excision: - Six lymph node, negative for metastatic tumor (0/6)  4. Omentum, excision: - Fibroadipose tissue with reactive changes and focal chronic inflammation, negative for metastatic tumor 6) Chest CT 4/18/24 wnl  7) Taxol 175mg/m2 + Carbo AUC 6 initiated 5/8/24      a) Neulasta added with C4 due to neutropenia      B) C6 T/C + Neulasta- decreased prechemo steroids and dose reduced Taxol 135mg/m2 80 VBT x 3 7/2024  Here for follow up prior to C5 Taxol/Carbo 7/31. Neulasta added last cycle. Pt finished VBT. + Lots of fatigue and urinary frequency. Pt reports Claritin helps with the body aches/pains from Neulasta. Neuropathy worsens but improves prior to next cycle. Lats time taking steroids she felt a "buzzing" feeling.   OBhx: CSx2, SAB Gynhx: Remote hx of abnormal pap smear (normal since then), hx fibroids. Menopause since age 54y.  PMH: asthma as a child, HTN, pre-diabetes, vertigo (saw neurologist and ENT), TAISHA PSH: CSx2, open cholecystectomy 1988, JIMI, BSO, bilateral SLN, omentectomy on 3/18/24 meds: triemterine 37.5 qd, meclizine prn, CPAP, nasal spray prn social: denies Fhx: Father w/ prostate ca (dx at 70's yo), 2 maternal aunts w/ breast ca (dx in their 50's), first cousin (maternal) w/ breast cancer (dx 39 yo). No genetic testing performed. Cousin w/ sarcoidosis  Last pap smear: last pap smear 2023 (normal) mammogram: 2023 wnl colonoscopy: last c-scope 10 years ago (wnl) cologuard 2022 wnl
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 The uterine canal is lobulated and markedly distended, possibly with blood products. Multiple bulky fibroids. Difficult to distinguish endometrial layer from intracanal contents. Findings are concerning for a neoplastic process. Gynecological consultation and tissue sampling recommended. 2. Nonspecific prominent lymph nodes, catalogued above. 3. Questionable left mid interpolar renal lesion versus loculated normal renal parenchyma. Can correlate with with outpatient MRI with intravenous contrast for further evaluation. 3) Hysterectomy, BSO, bilateral SLN, omentectomy 3/18/24 with Dr. De Oliveira     1. Uterus, cervix, bilateral tubes and ovaries and endometrial mass, resection: - Carcinosarcoma, 9 cm in greatest dimension, exophytic and friable detached mass with focal residual carcinoma component limited to the endometrium pT1a pN0 (please see the comment and synoptic report below) - No myometrial invasion seen - All resection margins are negative for tumor - No lymphovascular invasion seen - Multiple leiomyoms, intramural and subserosal - Bilateral tubes and ovaries sections with no significant pathologic findings - Cervix sections with no significant pathologic findings  2. Left pelvic lymph node, excision: - One lymph node, negative for metastatic tumor (0/1)  3. Right pelvic lymph node, excision: - Six lymph node, negative for metastatic tumor (0/6)  4. Omentum, excision: - Fibroadipose tissue with reactive changes and focal chronic inflammation, negative for metastatic tumor 6) Chest CT 4/18/24 wnl  7) Taxol 175mg/m2 + Carbo AUC 6 initiated 5/8/24      a) Neulasta added with C4 due to neutropenia      B) C6 T/C + Neulasta- decreased prechemo steroids and dose reduced Taxol 135mg/m2 80 VBT x 3 7/2024  Here for follow up prior to C5 Taxol/Carbo 7/31. Neulasta added last cycle. Pt finished VBT. + Lots of fatigue and urinary frequency. Pt reports Claritin helps with the body aches/pains from Neulasta. Neuropathy worsens but improves prior to next cycle. Lats time taking steroids she felt a "buzzing" feeling.   OBhx: CSx2, SAB Gynhx: Remote hx of abnormal pap smear (normal since then), hx fibroids. Menopause since age 54y.  PMH: asthma as a child, HTN, pre-diabetes, vertigo (saw neurologist and ENT), TAISHA PSH: CSx2, open cholecystectomy 1988, JIMI, BSO, bilateral SLN, omentectomy on 3/18/24 meds: triemterine 37.5 qd, meclizine prn, CPAP, nasal spray prn social: denies Fhx: Father w/ prostate ca (dx at 70's yo), 2 maternal aunts w/ breast ca (dx in their 50's), first cousin (maternal) w/ breast cancer (dx 41 yo). No genetic testing performed. Cousin w/ sarcoidosis  Last pap smear: last pap smear 2023 (normal) mammogram: 2023 wnl colonoscopy: last c-scope 10 years ago (wnl) cologuard 2022 wnl
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 The uterine canal is lobulated and markedly distended, possibly with blood products. Multiple bulky fibroids. Difficult to distinguish endometrial layer from intracanal contents. Findings are concerning for a neoplastic process. Gynecological consultation and tissue sampling recommended. 2. Nonspecific prominent lymph nodes, catalogued above. 3. Questionable left mid interpolar renal lesion versus loculated normal renal parenchyma. Can correlate with with outpatient MRI with intravenous contrast for further evaluation. 3) Hysterectomy, BSO, bilateral SLN, omentectomy 3/18/24 with Dr. De Oliveira     1. Uterus, cervix, bilateral tubes and ovaries and endometrial mass, resection: - Carcinosarcoma, 9 cm in greatest dimension, exophytic and friable detached mass with focal residual carcinoma component limited to the endometrium pT1a pN0 (please see the comment and synoptic report below) - No myometrial invasion seen - All resection margins are negative for tumor - No lymphovascular invasion seen - Multiple leiomyoms, intramural and subserosal - Bilateral tubes and ovaries sections with no significant pathologic findings - Cervix sections with no significant pathologic findings  2. Left pelvic lymph node, excision: - One lymph node, negative for metastatic tumor (0/1)  3. Right pelvic lymph node, excision: - Six lymph node, negative for metastatic tumor (0/6)  4. Omentum, excision: - Fibroadipose tissue with reactive changes and focal chronic inflammation, negative for metastatic tumor 6) Chest CT 4/18/24 wnl  7) Taxol 175mg/m2 + Carbo AUC 6 initiated 5/8/24      a) Neulasta added with C4 due to neutropenia      B) C6 T/C + Neulasta- decreased prechemo steroids and dose reduced Taxol 135mg/m2 80 VBT x 3 7/2024  Here for follow up prior to C5 Taxol/Carbo 7/31. Neulasta added last cycle. Pt finished VBT. + Lots of fatigue and urinary frequency. Pt reports Claritin helps with the body aches/pains from Neulasta. Neuropathy worsens but improves prior to next cycle. Lats time taking steroids she felt a "buzzing" feeling.   OBhx: CSx2, SAB Gynhx: Remote hx of abnormal pap smear (normal since then), hx fibroids. Menopause since age 54y.  PMH: asthma as a child, HTN, pre-diabetes, vertigo (saw neurologist and ENT), TAISHA PSH: CSx2, open cholecystectomy 1988, JIMI, BSO, bilateral SLN, omentectomy on 3/18/24 meds: triemterine 37.5 qd, meclizine prn, CPAP, nasal spray prn social: denies Fhx: Father w/ prostate ca (dx at 70's yo), 2 maternal aunts w/ breast ca (dx in their 50's), first cousin (maternal) w/ breast cancer (dx 41 yo). No genetic testing performed. Cousin w/ sarcoidosis  Last pap smear: last pap smear 2023 (normal) mammogram: 2023 wnl colonoscopy: last c-scope 10 years ago (wnl) cologuard 2022 wnl
No

## 2024-07-24 NOTE — PHYSICAL EXAM
[Chaperone Present] : A chaperone was present in the examining room during all aspects of the physical examination [Normal] : Palpation of skin and subcutaneous tissue: Normal turgor [Fully active, able to carry on all pre-disease performance without restriction] : Status 0 - Fully active, able to carry on all pre-disease performance without restriction [de-identified] : large midline vertical

## 2024-07-24 NOTE — PHYSICAL EXAM
[Chaperone Present] : A chaperone was present in the examining room during all aspects of the physical examination [Normal] : Palpation of skin and subcutaneous tissue: Normal turgor [Fully active, able to carry on all pre-disease performance without restriction] : Status 0 - Fully active, able to carry on all pre-disease performance without restriction [de-identified] : large midline vertical

## 2024-07-24 NOTE — PHYSICAL EXAM
[Chaperone Present] : A chaperone was present in the examining room during all aspects of the physical examination [Normal] : Palpation of skin and subcutaneous tissue: Normal turgor [Fully active, able to carry on all pre-disease performance without restriction] : Status 0 - Fully active, able to carry on all pre-disease performance without restriction [de-identified] : large midline vertical

## 2024-07-24 NOTE — DISCUSSION/SUMMARY
[Reviewed Clinical Lab Test(s)] : Results of clinical tests were reviewed. [Discuss Tests w/Referring Providers] : Results of labs/radiology studies and the treatment recommendations were discussed with performing/referring physician. [Discuss Alternatives/Risks/Benefits w/Patient] : All alternatives, risks, and benefits were discussed with the patient/family and all questions were answered.  Patient expressed good understanding and appreciates the importance of follow up as recommended. [Diagnosis/Stage ___] : Given this data, a diagnosis of [unfilled] is rendered. [FreeTextEntry1] : 65 yo s/p staging surgery for 1A uterine carcinosarcoma on adjuvant T/C   []C5 T/C 7/31- Will dose reduce Taxol 135mg/m2.  and decrease the IV steroids to 8mg.  []pre chemo labs drawn []chemotherapy calendar given - decrease steroids  []Long discussion regarding chemotherapy side effects. Patient very concerned about compounding of side effects.

## 2024-07-25 ENCOUNTER — APPOINTMENT (OUTPATIENT)
Dept: GYNECOLOGIC ONCOLOGY | Facility: CLINIC | Age: 64
End: 2024-07-25

## 2024-07-26 LAB
ALBUMIN SERPL ELPH-MCNC: 4.5 G/DL
ALP BLD-CCNC: 91 U/L
ALT SERPL-CCNC: 54 U/L
ANION GAP SERPL CALC-SCNC: 17 MMOL/L
AST SERPL-CCNC: 39 U/L
BASOPHILS # BLD AUTO: 0.05 K/UL
BASOPHILS NFR BLD AUTO: 0.7 %
BILIRUB SERPL-MCNC: 0.2 MG/DL
BUN SERPL-MCNC: 16 MG/DL
CALCIUM SERPL-MCNC: 9.3 MG/DL
CHLORIDE SERPL-SCNC: 99 MMOL/L
CO2 SERPL-SCNC: 23 MMOL/L
CREAT SERPL-MCNC: 0.58 MG/DL
EGFR: 102 ML/MIN/1.73M2
EOSINOPHIL # BLD AUTO: 0 K/UL
EOSINOPHIL NFR BLD AUTO: 0 %
GLUCOSE SERPL-MCNC: 104 MG/DL
HCT VFR BLD CALC: 30.3 %
HGB BLD-MCNC: 9.4 G/DL
IMM GRANULOCYTES NFR BLD AUTO: 2.2 %
LYMPHOCYTES # BLD AUTO: 1.23 K/UL
LYMPHOCYTES NFR BLD AUTO: 17 %
MAGNESIUM SERPL-MCNC: 1.8 MG/DL
MAN DIFF?: NORMAL
MCHC RBC-ENTMCNC: 28.3 PG
MCHC RBC-ENTMCNC: 31 GM/DL
MCV RBC AUTO: 91.3 FL
MONOCYTES # BLD AUTO: 0.98 K/UL
MONOCYTES NFR BLD AUTO: 13.6 %
NEUTROPHILS # BLD AUTO: 4.81 K/UL
NEUTROPHILS NFR BLD AUTO: 66.5 %
PLATELET # BLD AUTO: 76 K/UL
PMV BLD AUTO: 1 /100 WBCS
POTASSIUM SERPL-SCNC: 3.7 MMOL/L
PROT SERPL-MCNC: 7.2 G/DL
RBC # BLD: 3.32 M/UL
RBC # FLD: 17.5 %
SODIUM SERPL-SCNC: 140 MMOL/L
WBC # FLD AUTO: 7.23 K/UL

## 2024-07-29 ENCOUNTER — TRANSCRIPTION ENCOUNTER (OUTPATIENT)
Age: 64
End: 2024-07-29

## 2024-07-29 ENCOUNTER — APPOINTMENT (OUTPATIENT)
Dept: HEMATOLOGY ONCOLOGY | Facility: CLINIC | Age: 64
End: 2024-07-29
Payer: MEDICAID

## 2024-07-29 ENCOUNTER — APPOINTMENT (OUTPATIENT)
Dept: PALLIATIVE MEDICINE | Facility: CLINIC | Age: 64
End: 2024-07-29
Payer: MEDICAID

## 2024-07-29 VITALS
OXYGEN SATURATION: 97 % | HEART RATE: 91 BPM | WEIGHT: 234 LBS | TEMPERATURE: 98.3 F | RESPIRATION RATE: 18 BRPM | HEIGHT: 65.5 IN | BODY MASS INDEX: 38.52 KG/M2 | SYSTOLIC BLOOD PRESSURE: 125 MMHG | DIASTOLIC BLOOD PRESSURE: 83 MMHG

## 2024-07-29 DIAGNOSIS — Z51.5 ENCOUNTER FOR PALLIATIVE CARE: ICD-10-CM

## 2024-07-29 DIAGNOSIS — C80.1 MALIGNANT (PRIMARY) NEOPLASM, UNSPECIFIED: ICD-10-CM

## 2024-07-29 DIAGNOSIS — M25.561 PAIN IN RIGHT KNEE: ICD-10-CM

## 2024-07-29 DIAGNOSIS — M25.562 PAIN IN RIGHT KNEE: ICD-10-CM

## 2024-07-29 DIAGNOSIS — R11.0 NAUSEA: ICD-10-CM

## 2024-07-29 PROCEDURE — 90834 PSYTX W PT 45 MINUTES: CPT | Mod: 93

## 2024-07-29 PROCEDURE — 99205 OFFICE O/P NEW HI 60 MIN: CPT

## 2024-07-29 NOTE — ASSESSMENT
[FreeTextEntry1] : - 63 yo F with stage 1a uterine carcinosarcoma here for palliative care support.  - Medical cannabis certification completed today. Counseled patient on [facility] Medical Cannabis program. - Start cannabis at 1:1 THC:CBD at 2mg THC per dose, 2-3 times daily. - For excessive gas, continue with Beano and can add Gas-X (simethicone) up to 4x daily  F/u as needed

## 2024-07-29 NOTE — PHYSICAL EXAM
[General Appearance - Alert] : alert [General Appearance - In No Acute Distress] : in no acute distress [Sclera] : the sclera and conjunctiva were normal [PERRL With Normal Accommodation] : pupils were equal in size, round, and reactive to light [Extraocular Movements] : extraocular movements were intact [Normal Oral Mucosa] : normal oral mucosa [No Oral Pallor] : no oral pallor [No Oral Cyanosis] : no oral cyanosis [Outer Ear] : the ears and nose were normal in appearance [Oropharynx] : The oropharynx was normal [Neck Appearance] : the appearance of the neck was normal [Neck Cervical Mass (___cm)] : no neck mass was observed [Jugular Venous Distention Increased] : there was no jugular-venous distention [Thyroid Diffuse Enlargement] : the thyroid was not enlarged [Thyroid Nodule] : there were no palpable thyroid nodules [Respiration, Rhythm And Depth] : normal respiratory rhythm and effort [Auscultation Breath Sounds / Voice Sounds] : lungs were clear to auscultation bilaterally [Heart Rate And Rhythm] : heart rate was normal and rhythm regular [Heart Sounds] : normal S1 and S2 [Murmurs] : no murmurs [Edema] : there was no peripheral edema [Veins - Varicosity Changes] : there were no varicosital changes [Abdomen Soft] : soft [Abdomen Tenderness] : non-tender [Abdomen Mass (___ Cm)] : no abdominal mass palpated [Abnormal Walk] : normal gait [Involuntary Movements] : no involuntary movements were seen [Musculoskeletal - Swelling] : no joint swelling seen [Skin Color & Pigmentation] : normal skin color and pigmentation [Skin Turgor] : normal skin turgor [] : no rash [Oriented To Time, Place, And Person] : oriented to person, place, and time [Impaired Insight] : insight and judgment were intact [Affect] : the affect was normal

## 2024-07-29 NOTE — HISTORY OF PRESENT ILLNESS
[FreeTextEntry1] : 65 yo F with stage 1a uterine carcinosarcoma here for palliative care support.  Onc Hx: 1) Pelvic US 2/23/24 a) Uterus 17cm, 4.6cm endometrial stripe with endometrial mass 7cm 2) CTAP 3/17/24 - The uterine canal is lobulated and markedly distended, possibly with blood products. Multiple bulky fibroids. Difficult to distinguish endometrial layer from intracanal contents. Findings are concerning for a neoplastic process. Gynecological consultation and tissue sampling recommended. Nonspecific prominent lymph nodes, catalogued above. 3/18/24 - Hysterectomy, BSO, bilateral SLN, omentectomy with Dr. De Oliveira. Carcinosarcoma, 9 cm in greatest dimension, exophytic and friable detached mass with focal residual carcinoma component limited to the endometrium pT1a pN0. No myometrial invasion seen. All resection margins are negative for tumor 5/8/24 - Taxol 175mg/m2 + Carbo AUC 6 initiated. Dose reduced Taxol 135mg/m2  Providers: Gyn Onc - Ruma Evans Psycho-Onc - Roselyn Castillo, PHD Rad Onc - Gabriella Wernicke  SYMPTOMS: #Neuropathy Fingers and balls of feet. Numbness without pain.  #Pain Location - Joints (knees, back) Quality - Achy Radiation - None Timing - Constant Aggravating factors - None Minimal acceptable level (0-10 scale) - 3/10 Severity in last 24h (0-10 scale) - 1/10 Current score (0-10 scale) - 1/10 General aches and pains from Neulasta, improved with Claritin and tylenol. ISTOP Ref #: [id number]  #Nausea/Poor appetite/Poor taste Weight has been stable but feels like it is a little difficult to eat, that she feels queasy. Keeps eating small amounts throughout day to feel more comfortable. Also having some excessive gas, taking Beano which helps.   ROS: If [ ] blank, symptom not present Fatigue [ ] Nausea [ ] Loss of appetite [ ] Unintentional weight loss [ ] Constipation [ ] Diarrhea [ ] Anxiety [ ] Low mood [ ] Other symptoms: [x ] All other review of symptoms negative   SH: Lives with her  and 86 year old mother who has dementia. Also has 2 adult children.

## 2024-07-31 ENCOUNTER — APPOINTMENT (OUTPATIENT)
Dept: INFUSION THERAPY | Facility: CLINIC | Age: 64
End: 2024-07-31

## 2024-07-31 ENCOUNTER — OUTPATIENT (OUTPATIENT)
Dept: OUTPATIENT SERVICES | Facility: HOSPITAL | Age: 64
LOS: 1 days | End: 2024-07-31
Payer: MEDICAID

## 2024-07-31 VITALS
HEIGHT: 66 IN | HEART RATE: 89 BPM | WEIGHT: 235.01 LBS | RESPIRATION RATE: 17 BRPM | OXYGEN SATURATION: 99 % | DIASTOLIC BLOOD PRESSURE: 82 MMHG | TEMPERATURE: 98 F | SYSTOLIC BLOOD PRESSURE: 131 MMHG

## 2024-07-31 DIAGNOSIS — Z90.49 ACQUIRED ABSENCE OF OTHER SPECIFIED PARTS OF DIGESTIVE TRACT: Chronic | ICD-10-CM

## 2024-07-31 DIAGNOSIS — C55 MALIGNANT NEOPLASM OF UTERUS, PART UNSPECIFIED: ICD-10-CM

## 2024-07-31 DIAGNOSIS — Z98.891 HISTORY OF UTERINE SCAR FROM PREVIOUS SURGERY: Chronic | ICD-10-CM

## 2024-07-31 LAB
HCT VFR BLD CALC: 28.1 % — LOW (ref 34.5–45)
HGB BLD-MCNC: 9 G/DL — LOW (ref 11.5–15.5)
LYMPHOCYTES # BLD AUTO: 1.1 K/UL — SIGNIFICANT CHANGE UP (ref 1–3.3)
LYMPHOCYTES # BLD AUTO: 19.6 % — SIGNIFICANT CHANGE UP (ref 13–44)
MCHC RBC-ENTMCNC: 29 PG — SIGNIFICANT CHANGE UP (ref 27–34)
MCHC RBC-ENTMCNC: 32 GM/DL — SIGNIFICANT CHANGE UP (ref 32–36)
MCV RBC AUTO: 90.6 FL — SIGNIFICANT CHANGE UP (ref 80–100)
NEUTROPHILS # BLD AUTO: 3.9 K/UL — SIGNIFICANT CHANGE UP (ref 1.8–7.4)
NEUTROPHILS NFR BLD AUTO: 67.7 % — SIGNIFICANT CHANGE UP (ref 43–77)
PLATELET # BLD AUTO: 119 K/UL — LOW (ref 150–400)
RBC # BLD: 3.1 M/UL — LOW (ref 3.8–5.2)
RBC # FLD: 17.4 % — HIGH (ref 10.3–14.5)
WBC # BLD: 5.7 K/UL — SIGNIFICANT CHANGE UP (ref 3.8–10.5)
WBC # FLD AUTO: 5.7 K/UL — SIGNIFICANT CHANGE UP (ref 3.8–10.5)

## 2024-07-31 PROCEDURE — 96413 CHEMO IV INFUSION 1 HR: CPT

## 2024-07-31 PROCEDURE — 96375 TX/PRO/DX INJ NEW DRUG ADDON: CPT

## 2024-07-31 PROCEDURE — 96415 CHEMO IV INFUSION ADDL HR: CPT

## 2024-07-31 PROCEDURE — 96367 TX/PROPH/DG ADDL SEQ IV INF: CPT

## 2024-07-31 PROCEDURE — 96417 CHEMO IV INFUS EACH ADDL SEQ: CPT

## 2024-07-31 PROCEDURE — 36415 COLL VENOUS BLD VENIPUNCTURE: CPT

## 2024-07-31 PROCEDURE — 85025 COMPLETE CBC W/AUTO DIFF WBC: CPT

## 2024-07-31 RX ORDER — PACLITAXEL 6 MG/ML
292 INJECTION, SOLUTION INTRAVENOUS ONCE
Refills: 0 | Status: COMPLETED | OUTPATIENT
Start: 2024-07-31 | End: 2024-07-31

## 2024-07-31 RX ORDER — FAMOTIDINE 40 MG/1
20 TABLET, FILM COATED ORAL ONCE
Refills: 0 | Status: COMPLETED | OUTPATIENT
Start: 2024-07-31 | End: 2024-07-31

## 2024-07-31 RX ORDER — PALONOSETRON HYDROCHLORIDE 0.25 MG/5ML
0.25 INJECTION, SOLUTION INTRAVENOUS ONCE
Refills: 0 | Status: COMPLETED | OUTPATIENT
Start: 2024-07-31 | End: 2024-07-31

## 2024-07-31 RX ORDER — DEXAMETHASONE 1.5 MG/1
8 TABLET ORAL ONCE
Refills: 0 | Status: COMPLETED | OUTPATIENT
Start: 2024-07-31 | End: 2024-07-31

## 2024-07-31 RX ORDER — FOSAPREPITANT DIMEGLUMINE 150 MG/5ML
150 INJECTION, POWDER, LYOPHILIZED, FOR SOLUTION INTRAVENOUS ONCE
Refills: 0 | Status: COMPLETED | OUTPATIENT
Start: 2024-07-31 | End: 2024-07-31

## 2024-07-31 RX ORDER — DIPHENHYDRAMINE HCL 25 MG
25 CAPSULE ORAL ONCE
Refills: 0 | Status: COMPLETED | OUTPATIENT
Start: 2024-07-31 | End: 2024-07-31

## 2024-07-31 RX ADMIN — PACLITAXEL 292 MILLIGRAM(S): 6 INJECTION, SOLUTION INTRAVENOUS at 13:13

## 2024-07-31 RX ADMIN — Medication 202 MILLIGRAM(S): at 13:05

## 2024-07-31 RX ADMIN — DEXAMETHASONE 203.2 MILLIGRAM(S): 1.5 TABLET ORAL at 11:45

## 2024-07-31 RX ADMIN — FAMOTIDINE 20 MILLIGRAM(S): 40 TABLET, FILM COATED ORAL at 12:05

## 2024-07-31 RX ADMIN — DEXAMETHASONE 8 MILLIGRAM(S): 1.5 TABLET ORAL at 12:00

## 2024-07-31 RX ADMIN — PALONOSETRON HYDROCHLORIDE 0.25 MILLIGRAM(S): 0.25 INJECTION, SOLUTION INTRAVENOUS at 12:05

## 2024-07-31 RX ADMIN — FOSAPREPITANT DIMEGLUMINE 500 MILLIGRAM(S): 150 INJECTION, POWDER, LYOPHILIZED, FOR SOLUTION INTRAVENOUS at 12:08

## 2024-07-31 RX ADMIN — Medication 889 MILLIGRAM(S): at 13:15

## 2024-07-31 RX ADMIN — FOSAPREPITANT DIMEGLUMINE 150 MILLIGRAM(S): 150 INJECTION, POWDER, LYOPHILIZED, FOR SOLUTION INTRAVENOUS at 12:40

## 2024-07-31 RX ADMIN — Medication 889 MILLIGRAM(S): at 17:20

## 2024-07-31 RX ADMIN — Medication 25 MILLIGRAM(S): at 13:20

## 2024-08-01 ENCOUNTER — OUTPATIENT (OUTPATIENT)
Dept: OUTPATIENT SERVICES | Facility: HOSPITAL | Age: 64
LOS: 1 days | End: 2024-08-01
Payer: MEDICAID

## 2024-08-01 ENCOUNTER — APPOINTMENT (OUTPATIENT)
Dept: INFUSION THERAPY | Facility: CLINIC | Age: 64
End: 2024-08-01

## 2024-08-01 VITALS
DIASTOLIC BLOOD PRESSURE: 74 MMHG | TEMPERATURE: 98 F | OXYGEN SATURATION: 99 % | SYSTOLIC BLOOD PRESSURE: 112 MMHG | RESPIRATION RATE: 18 BRPM | HEART RATE: 78 BPM

## 2024-08-01 DIAGNOSIS — C55 MALIGNANT NEOPLASM OF UTERUS, PART UNSPECIFIED: ICD-10-CM

## 2024-08-01 DIAGNOSIS — Z98.891 HISTORY OF UTERINE SCAR FROM PREVIOUS SURGERY: Chronic | ICD-10-CM

## 2024-08-01 DIAGNOSIS — Z90.49 ACQUIRED ABSENCE OF OTHER SPECIFIED PARTS OF DIGESTIVE TRACT: Chronic | ICD-10-CM

## 2024-08-01 PROCEDURE — 96372 THER/PROPH/DIAG INJ SC/IM: CPT

## 2024-08-01 RX ORDER — PEGFILGRASTIM 6 MG/.6ML
6 INJECTION SUBCUTANEOUS ONCE
Refills: 0 | Status: COMPLETED | OUTPATIENT
Start: 2024-08-01 | End: 2024-08-01

## 2024-08-01 RX ADMIN — PEGFILGRASTIM 6 MILLIGRAM(S): 6 INJECTION SUBCUTANEOUS at 16:48

## 2024-08-05 ENCOUNTER — APPOINTMENT (OUTPATIENT)
Dept: HEMATOLOGY ONCOLOGY | Facility: CLINIC | Age: 64
End: 2024-08-05

## 2024-08-05 PROCEDURE — 90834 PSYTX W PT 45 MINUTES: CPT | Mod: 93

## 2024-08-12 ENCOUNTER — APPOINTMENT (OUTPATIENT)
Dept: HEMATOLOGY ONCOLOGY | Facility: CLINIC | Age: 64
End: 2024-08-12
Payer: MEDICAID

## 2024-08-12 ENCOUNTER — NON-APPOINTMENT (OUTPATIENT)
Age: 64
End: 2024-08-12

## 2024-08-12 DIAGNOSIS — F43.23 ADJUSTMENT DISORDER WITH MIXED ANXIETY AND DEPRESSED MOOD: ICD-10-CM

## 2024-08-12 PROCEDURE — 90791 PSYCH DIAGNOSTIC EVALUATION: CPT | Mod: 95

## 2024-08-13 ENCOUNTER — LABORATORY RESULT (OUTPATIENT)
Age: 64
End: 2024-08-13

## 2024-08-13 ENCOUNTER — APPOINTMENT (OUTPATIENT)
Dept: GYNECOLOGIC ONCOLOGY | Facility: CLINIC | Age: 64
End: 2024-08-13
Payer: MEDICAID

## 2024-08-13 VITALS
DIASTOLIC BLOOD PRESSURE: 80 MMHG | HEIGHT: 65.5 IN | OXYGEN SATURATION: 100 % | SYSTOLIC BLOOD PRESSURE: 128 MMHG | BODY MASS INDEX: 38.52 KG/M2 | HEART RATE: 95 BPM | TEMPERATURE: 97.6 F | WEIGHT: 234 LBS

## 2024-08-13 PROCEDURE — 99214 OFFICE O/P EST MOD 30 MIN: CPT

## 2024-08-13 NOTE — HISTORY OF PRESENT ILLNESS
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 The uterine canal is lobulated and markedly distended, possibly with blood products. Multiple bulky fibroids. Difficult to distinguish endometrial layer from intracanal contents. Findings are concerning for a neoplastic process. Gynecological consultation and tissue sampling recommended. 2. Nonspecific prominent lymph nodes, catalogued above. 3. Questionable left mid interpolar renal lesion versus loculated normal renal parenchyma. Can correlate with with outpatient MRI with intravenous contrast for further evaluation. 3) Hysterectomy, BSO, bilateral SLN, omentectomy 3/18/24 with Dr. De Oliveira     1. Uterus, cervix, bilateral tubes and ovaries and endometrial mass, resection: - Carcinosarcoma, 9 cm in greatest dimension, exophytic and friable detached mass with focal residual carcinoma component limited to the endometrium pT1a pN0 (please see the comment and synoptic report below) - No myometrial invasion seen - All resection margins are negative for tumor - No lymphovascular invasion seen - Multiple leiomyoms, intramural and subserosal - Bilateral tubes and ovaries sections with no significant pathologic findings - Cervix sections with no significant pathologic findings  2. Left pelvic lymph node, excision: - One lymph node, negative for metastatic tumor (0/1)  3. Right pelvic lymph node, excision: - Six lymph node, negative for metastatic tumor (0/6)  4. Omentum, excision: - Fibroadipose tissue with reactive changes and focal chronic inflammation, negative for metastatic tumor 6) Chest CT 4/18/24 wnl  7) Taxol 175mg/m2 + Carbo AUC 6 initiated 5/8/24      a) Neulasta added with C4 due to neutropenia      B) C6 T/C + Neulasta- decreased prechemo steroids and dose reduced Taxol 135mg/m2 80 VBT x 3 7/2024  Here for follow up prior to C6 Taxol/Carbo 8/21. Fatigue was significantly worse with C5. Just started medical marijuana which helps with sleep. Has an appetite but only for very specific foods so eating less overall. Taking protein shake. Neuropathy is there but better. Constipation is manageable. Would like to stop treatment, going away for 4 weeks.   OBhx: CSx2, SAB Gynhx: Remote hx of abnormal pap smear (normal since then), hx fibroids. Menopause since age 54y.  PMH: asthma as a child, HTN, pre-diabetes, vertigo (saw neurologist and ENT), TAISHA PSH: CSx2, open cholecystectomy 1988, JIMI, BSO, bilateral SLN, omentectomy on 3/18/24 meds: triemterine 37.5 qd, meclizine prn, CPAP, nasal spray prn social: denies Fhx: Father w/ prostate ca (dx at 70's yo), 2 maternal aunts w/ breast ca (dx in their 50's), first cousin (maternal) w/ breast cancer (dx 39 yo). No genetic testing performed. Cousin w/ sarcoidosis  Last pap smear: last pap smear 2023 (normal) mammogram: 2023 wnl colonoscopy: last c-scope 10 years ago (wnl) cologuard 2022 wnl

## 2024-08-13 NOTE — PHYSICAL EXAM
[Chaperone Present] : A chaperone was present in the examining room during all aspects of the physical examination [Normal] : Palpation of skin and subcutaneous tissue: Normal turgor [Fully active, able to carry on all pre-disease performance without restriction] : Status 0 - Fully active, able to carry on all pre-disease performance without restriction [de-identified] : large midline vertical

## 2024-08-13 NOTE — DISCUSSION/SUMMARY
[Reviewed Clinical Lab Test(s)] : Results of clinical tests were reviewed. [Discuss Tests w/Referring Providers] : Results of labs/radiology studies and the treatment recommendations were discussed with performing/referring physician. [Discuss Alternatives/Risks/Benefits w/Patient] : All alternatives, risks, and benefits were discussed with the patient/family and all questions were answered.  Patient expressed good understanding and appreciates the importance of follow up as recommended. [Diagnosis/Stage ___] : Given this data, a diagnosis of [unfilled] is rendered. [FreeTextEntry1] : 65 yo s/p staging surgery for 1A uterine carcinosarcoma s/p vaginal brachytherapy and 5 cycles of Taxol/Carbo Patient does not desire further treatment Full labs drawn with iron studies.  Plan CTAP when patient returns from trip Follow up with Dr. Hartman in October for survivorship planning

## 2024-08-14 LAB
ALBUMIN SERPL ELPH-MCNC: 4.4 G/DL
ALP BLD-CCNC: 85 U/L
ALT SERPL-CCNC: 49 U/L
ANION GAP SERPL CALC-SCNC: 20 MMOL/L
AST SERPL-CCNC: 46 U/L
BASOPHILS # BLD AUTO: 0.02 K/UL
BASOPHILS NFR BLD AUTO: 0.5 %
BILIRUB SERPL-MCNC: 0.4 MG/DL
BUN SERPL-MCNC: 20 MG/DL
CALCIUM SERPL-MCNC: 9.7 MG/DL
CHLORIDE SERPL-SCNC: 97 MMOL/L
CO2 SERPL-SCNC: 24 MMOL/L
CREAT SERPL-MCNC: 0.66 MG/DL
EGFR: 99 ML/MIN/1.73M2
EOSINOPHIL # BLD AUTO: 0.01 K/UL
EOSINOPHIL NFR BLD AUTO: 0.2 %
HCT VFR BLD CALC: 24.8 %
HGB BLD-MCNC: 8.1 G/DL
IMM GRANULOCYTES NFR BLD AUTO: 0.2 %
LYMPHOCYTES # BLD AUTO: 1.11 K/UL
LYMPHOCYTES NFR BLD AUTO: 25.5 %
MAGNESIUM SERPL-MCNC: 1.7 MG/DL
MAN DIFF?: NORMAL
MCHC RBC-ENTMCNC: 30.1 PG
MCHC RBC-ENTMCNC: 32.7 GM/DL
MCV RBC AUTO: 92.2 FL
MONOCYTES # BLD AUTO: 0.48 K/UL
MONOCYTES NFR BLD AUTO: 11 %
NEUTROPHILS # BLD AUTO: 2.73 K/UL
NEUTROPHILS NFR BLD AUTO: 62.6 %
PLATELET # BLD AUTO: 26 K/UL
POTASSIUM SERPL-SCNC: 3.2 MMOL/L
PROT SERPL-MCNC: 7.6 G/DL
RBC # BLD: 2.69 M/UL
RBC # FLD: 18.6 %
SODIUM SERPL-SCNC: 141 MMOL/L
WBC # FLD AUTO: 4.36 K/UL

## 2024-08-14 RX ORDER — POTASSIUM CHLORIDE 1.5 G/1.58G
20 POWDER, FOR SOLUTION ORAL
Qty: 3 | Refills: 0 | Status: ACTIVE | COMMUNITY
Start: 2024-08-14 | End: 1900-01-01

## 2024-08-21 ENCOUNTER — APPOINTMENT (OUTPATIENT)
Dept: GYNECOLOGIC ONCOLOGY | Facility: CLINIC | Age: 64
End: 2024-08-21
Payer: MEDICAID

## 2024-08-21 ENCOUNTER — LABORATORY RESULT (OUTPATIENT)
Age: 64
End: 2024-08-21

## 2024-08-21 ENCOUNTER — APPOINTMENT (OUTPATIENT)
Dept: INFUSION THERAPY | Facility: CLINIC | Age: 64
End: 2024-08-21

## 2024-08-21 PROCEDURE — 36415 COLL VENOUS BLD VENIPUNCTURE: CPT

## 2024-08-22 ENCOUNTER — APPOINTMENT (OUTPATIENT)
Dept: INFUSION THERAPY | Facility: CLINIC | Age: 64
End: 2024-08-22

## 2024-08-22 LAB
ALBUMIN SERPL ELPH-MCNC: 4.2 G/DL
ALP BLD-CCNC: 73 U/L
ALT SERPL-CCNC: 48 U/L
ANION GAP SERPL CALC-SCNC: 17 MMOL/L
AST SERPL-CCNC: 46 U/L
BASOPHILS # BLD AUTO: 0.01 K/UL
BASOPHILS NFR BLD AUTO: 0.6 %
BILIRUB SERPL-MCNC: 0.7 MG/DL
BUN SERPL-MCNC: 19 MG/DL
CALCIUM SERPL-MCNC: 9.3 MG/DL
CHLORIDE SERPL-SCNC: 101 MMOL/L
CO2 SERPL-SCNC: 25 MMOL/L
CREAT SERPL-MCNC: 0.67 MG/DL
EGFR: 98 ML/MIN/1.73M2
EOSINOPHIL # BLD AUTO: 0.01 K/UL
EOSINOPHIL NFR BLD AUTO: 0.6 %
HCT VFR BLD CALC: 23 %
HGB BLD-MCNC: 7.3 G/DL
IMM GRANULOCYTES NFR BLD AUTO: 0.6 %
LYMPHOCYTES # BLD AUTO: 0.83 K/UL
LYMPHOCYTES NFR BLD AUTO: 47.2 %
MAGNESIUM SERPL-MCNC: 1.7 MG/DL
MAN DIFF?: NORMAL
MCHC RBC-ENTMCNC: 30.5 PG
MCHC RBC-ENTMCNC: 31.7 GM/DL
MCV RBC AUTO: 96.2 FL
MONOCYTES # BLD AUTO: 0.26 K/UL
MONOCYTES NFR BLD AUTO: 14.8 %
NEUTROPHILS # BLD AUTO: 0.64 K/UL
NEUTROPHILS NFR BLD AUTO: 36.2 %
PLATELET # BLD AUTO: 24 K/UL
POTASSIUM SERPL-SCNC: 3 MMOL/L
PROT SERPL-MCNC: 7.3 G/DL
RBC # BLD: 2.39 M/UL
RBC # FLD: 19.5 %
SODIUM SERPL-SCNC: 143 MMOL/L
WBC # FLD AUTO: 1.76 K/UL

## 2024-08-23 ENCOUNTER — OUTPATIENT (OUTPATIENT)
Dept: OUTPATIENT SERVICES | Facility: HOSPITAL | Age: 64
LOS: 1 days | End: 2024-08-23
Payer: MEDICAID

## 2024-08-23 ENCOUNTER — APPOINTMENT (OUTPATIENT)
Dept: INFUSION THERAPY | Facility: CLINIC | Age: 64
End: 2024-08-23

## 2024-08-23 VITALS
TEMPERATURE: 97 F | DIASTOLIC BLOOD PRESSURE: 73 MMHG | HEART RATE: 90 BPM | RESPIRATION RATE: 18 BRPM | SYSTOLIC BLOOD PRESSURE: 113 MMHG | OXYGEN SATURATION: 99 % | HEIGHT: 66 IN | WEIGHT: 231.93 LBS

## 2024-08-23 DIAGNOSIS — Z98.891 HISTORY OF UTERINE SCAR FROM PREVIOUS SURGERY: Chronic | ICD-10-CM

## 2024-08-23 DIAGNOSIS — C55 MALIGNANT NEOPLASM OF UTERUS, PART UNSPECIFIED: ICD-10-CM

## 2024-08-23 DIAGNOSIS — Z90.49 ACQUIRED ABSENCE OF OTHER SPECIFIED PARTS OF DIGESTIVE TRACT: Chronic | ICD-10-CM

## 2024-08-23 PROCEDURE — 96360 HYDRATION IV INFUSION INIT: CPT

## 2024-08-23 PROCEDURE — 36430 TRANSFUSION BLD/BLD COMPNT: CPT

## 2024-08-23 RX ORDER — POTASSIUM CHLORIDE 1500 MG/1
20 TABLET, FILM COATED, EXTENDED RELEASE ORAL
Qty: 21 | Refills: 0 | Status: ACTIVE | COMMUNITY
Start: 2024-08-23 | End: 1900-01-01

## 2024-08-23 RX ADMIN — Medication 100 MILLILITER(S): at 16:25

## 2024-08-23 RX ADMIN — Medication 110 MILLILITER(S): at 16:20

## 2024-08-23 RX ADMIN — Medication 110 MILLILITER(S): at 15:16

## 2024-08-26 ENCOUNTER — LABORATORY RESULT (OUTPATIENT)
Age: 64
End: 2024-08-26

## 2024-08-26 ENCOUNTER — APPOINTMENT (OUTPATIENT)
Dept: GYNECOLOGIC ONCOLOGY | Facility: CLINIC | Age: 64
End: 2024-08-26
Payer: MEDICAID

## 2024-08-26 DIAGNOSIS — C80.1 MALIGNANT (PRIMARY) NEOPLASM, UNSPECIFIED: ICD-10-CM

## 2024-08-26 PROCEDURE — 36415 COLL VENOUS BLD VENIPUNCTURE: CPT

## 2024-08-27 LAB
ALBUMIN SERPL ELPH-MCNC: 4.4 G/DL
ALP BLD-CCNC: 66 U/L
ALT SERPL-CCNC: 60 U/L
ANION GAP SERPL CALC-SCNC: 14 MMOL/L
AST SERPL-CCNC: 60 U/L
BASOPHILS # BLD AUTO: 0 K/UL
BASOPHILS NFR BLD AUTO: 0 %
BILIRUB SERPL-MCNC: 0.6 MG/DL
BUN SERPL-MCNC: 16 MG/DL
CALCIUM SERPL-MCNC: 9.5 MG/DL
CHLORIDE SERPL-SCNC: 98 MMOL/L
CO2 SERPL-SCNC: 25 MMOL/L
CREAT SERPL-MCNC: 0.67 MG/DL
EGFR: 98 ML/MIN/1.73M2
EOSINOPHIL # BLD AUTO: 0 K/UL
EOSINOPHIL NFR BLD AUTO: 0 %
HCT VFR BLD CALC: 30.1 %
HGB BLD-MCNC: 9.6 G/DL
LYMPHOCYTES # BLD AUTO: 0.5 K/UL
LYMPHOCYTES NFR BLD AUTO: 26.1 %
MAGNESIUM SERPL-MCNC: 1.7 MG/DL
MAN DIFF?: NORMAL
MCHC RBC-ENTMCNC: 31.4 PG
MCHC RBC-ENTMCNC: 31.9 GM/DL
MCV RBC AUTO: 98.4 FL
MONOCYTES # BLD AUTO: 0.07 K/UL
MONOCYTES NFR BLD AUTO: 3.5 %
NEUTROPHILS # BLD AUTO: 1.23 K/UL
NEUTROPHILS NFR BLD AUTO: 60 %
PLATELET # BLD AUTO: 32 K/UL
POTASSIUM SERPL-SCNC: 3.5 MMOL/L
PROT SERPL-MCNC: 7.3 G/DL
RBC # BLD: 3.06 M/UL
RBC # FLD: 18.2 %
SODIUM SERPL-SCNC: 137 MMOL/L
WBC # FLD AUTO: 1.91 K/UL

## 2024-09-04 NOTE — HISTORY OF PRESENT ILLNESS
[FreeTextEntry1] : Ms Garza is a 65 y/o F who completed 2100cGy in 3Fx of radiation on 7/15/2024 for a stage 1A uterine carcinosarcoma.  Chemo started 24 6 cycles. Patient completed 5 cycles of Taxol/Carbo .  Plan CTAP when patient returns from trip . Patient followed by Dr Townsend, and Dr Barahona.  2024 - PTE Patient presents today...................  ________________________________________________ ONCOLOGIC HISTORY: Aurna Garza is a 65 y/o F with a PMH of hypertension, pre-diabetes, vertigo, fatty liver, TAISHA, referred by Dr Hartman for consideration of radiation therapy for newly diagnosed stage 1A uterine carcinosarcoma 3/2024. She is s/p TLH, BSO, bilateral SLN dx, omentectomy 3/18/24 Dr. De Oliveira.    She had postmenopausal spotting 3 years ago, her GYN performed EMB which showed insufficient tissue. She was closely monitored, and the bleeding stopped. She then started feeling leakage of urine, w/ tinge of blood in 2024, which led her to go to urgent care who treated her for a UTI. Her PCP told her to see a GYN, who referred her for pelvic ultrasound. She ultimately saw Dr. Sharp who referred her to Dr. De Oliveira. She started having heavy vaginal bleeding 3/2024 so she went to the ED. She was admitted, and Dr. De Oliveira performed total abdominal hysterectomy, BSO, bilateral SLN, omentectomy on 3/18/24 (frozen section sent showing high grade carcinoma). Patient discussed at multidisciplinary tumor board on 24. Recommendation was for T/C (carboplatin/paclitaxel) x 6 cycles with vaginal brachytherapy. Genetic testing - results NEGATIVE Dr. Hartman - Chemo started 24 - C2  next .  Chest CT 24 wnl   24 - Consult She comes today with her , she offers no complaints. She is currently getting chemo at the moment and is feeling the effects. She states she has had some changes in her bowel pattern, she states she is fine today. She states she is quite tired and is concerned about RT increasing her fatigue. Patient educated about RT and its side effects. All questions answered. Prior Chemo: No Prior RT: No Pacemaker: No Menarche: age 14 LMP: Age 54 years   Birth Control: NO HRT: NO      2024 - ULTRASOUND ABDOMEN, RETROPERITONEAL AND PELVIS  FINDINGS:  Abdominal/renal sonogram:  Echogenic liver suspicious for hepatic steatosis.  Right kidney measures 12.5 x 5.7 x 5.7 cm. Caliectasis right kidney with no obvious solid mass or renal stone.  Left kidney measures 12.2 x 5.7 x 5.7 cm. A prominent column of Eb is suspected within the mid pole with no obvious calcification or obstruction.  Transabdominal pelvic sonogram:  Anteverted uterus 16.8 x 7.8 x 11.1 cm with a volume of 762cc. Endometrial echo appears 3 markedly thickened measuring 4.6 cm. Differential diagnosis should include endometrial carcinoma or a giant polyp. Echogenic lesion within the endometrial cavity measures approximately 7.4cm craniocaudal by 3.7 cm AP. Small amount of fluid within the canal is also identified. Tissue sampling is strongly suggested.  Both ovaries are not seen transabdominally. Uterine fibroids are present described transvaginally.    TRANSVAGINAL SONOGRAPHY:  Uterus is anteverted measuring 10.6 x 7.0 x 9.3 cm with a volume of 361 cc. Endometrial echo measures approximately 5mm. Uterine wall is heterogeneous containing fibroids including:  Subserosal fundal fibroid 8.2 x 8.7 x 8.9 cm.  Anterior mid subserosal fibroid 5.3 x 4.4 x 5.2 cm.   IMPRESSION:   Question giant endometrial polyp versus abnormal endometrial echo. Tissue sampling is strongly suggested. Alternatively pelvic MRI may be helpful.  Leiomyomatous uterus.  Ovaries are not visualized.  Mild caliectasis right kidney.  Prominent column of Eb left kidney.     3/18/2024 - Surgical Pathology Report - Auth (Verified)  Specimen(s) Submitted   1. Uterus, cervix, bilateral tubes and ovaries and endometrial mass   2. Left pelvic lymph node   3. Right pelvic lymph node   4. Omentum  Final Diagnosis   1. Uterus, cervix, bilateral tubes and ovaries and endometrial mass, resection:   - Carcinosarcoma, 9 cm in greatest dimension, exophytic and friable detached mass with focal residual carcinoma component limited to the endometrium pT1a pN0 (please see the comment and synoptic report below)   - No myometrial invasion seen   - All resection margins are negative for tumor   - No lymphovascular invasion seen   - Multiple leiomyoms, intramural and subserosal   - Bilateral tubes and ovaries sections with no significant pathologic findings   - Cervix sections with no significant pathologic findings   2. Left pelvic lymph node, excision:   - One lymph node, negative for metastatic tumor (0/1)   3. Right pelvic lymph node, excision:   - Six lymph node, negative for metastatic tumor (0/6)   4. Omentum, excision:   - Fibroadipose tissue with reactive changes and focal chronic inflammation, negative for metastatic tumor   Synoptic Summary   1: Endometrium   - Hysterectomy   Specimen   Procedure: Total hysterectomy and bilateral salpingo-oophorectomy; Omentectomy   Tumor   Histologic Type: Carcinosarcoma   Myometrial Invasion: Not identified   Uterine Serosa Involvement: Not identified   Lower Uterine Segment Involvement: Not identified  Cervical Stromal Involvement: Not identified   Other Tissue / Organ Involvement: Not identified   Lymphatic and / or Vascular Invasion: Not identified   Margins Margin Status: All margins negative for invasive carcinoma   Regional Lymph Nodes   Regional Lymph Node Status: All regional lymph nodes negative for tumor cells   Lymph Nodes Examined   Total Number of Pelvic Nodes Examined: 7   Total Number of Para-aortic Nodes Examined: Cannot be determined   - Not submitted   pTNM Classification (AJCC 8th Edition)   pT Category: pT1a   pN Category: pN0   Best Tumor Blocks for Future Studies   Tumor Block(s): Block 1Q has good neoplastic content for further studies if clinically indicated.   1. Uterus, cervix, bilateral tubes and ovaries and endometrial mass, resection, ( 53-CK- 1L-1R) 9 slides:   Slides 1L-1M (designated as leiomyoma in the gross description):   - Leiomyoma(s), in part cellular with scattered foci of bizarre nuclei   Note: Although, increased cellularity is present in some sections, only mild cytological atypia and low mitotic count (less than 1 per 10 high power fields) are noted, without coagulative type of necrosis, findings supportive of the diagnosis.   Slides 1N-1R (designated as endometrial mass in the gross description): - Carcinosarcoma, heterologous type (with chondrosarcomatous differentiation)   Note: The tumor cells show aberrant p53 expression (mutated phennotype) and are positive for p16 (diffuse and strong staining), findings supportive of the diagnosis.    Surgical Pathology Consultation Report - Auth (Verified) Specimen(s) Submitted Cervix Final Diagnosis   1. Uterus, cervix, bilateral tubes and ovaries and endometrial mass, resection, ( 47-EO- 1L-1R) 9 slides:   Slides 1L-1M (designated as leiomyoma in the gross description):   - Leiomyoma(s), in part cellular with scattered foci of bizarre nuclei   Note: Although, increased cellularity is present in some sections, only mild cytological atypia and low mitotic count (less than 1 per 10 high power fields) are noted, without coagulative type of necrosis, findings supportive of the diagnosis.   Slides 1N-1R (designated as endometrial mass in the gross description): - Carcinosarcoma, heterologous type (with chondrosarcomatous differentiation)   Note: The tumor cells show aberrant p53 expression (mutated phennotype) and are positive for p16 (diffuse and strong staining), findings supportive of the diagnosis.   2024 - CT CHEST IMPRESSION: Negative for pulmonary metastasis.

## 2024-09-05 ENCOUNTER — APPOINTMENT (OUTPATIENT)
Dept: RADIATION ONCOLOGY | Facility: CLINIC | Age: 64
End: 2024-09-05

## 2024-09-30 ENCOUNTER — APPOINTMENT (OUTPATIENT)
Dept: HEMATOLOGY ONCOLOGY | Facility: CLINIC | Age: 64
End: 2024-09-30
Payer: MEDICAID

## 2024-09-30 DIAGNOSIS — F43.23 ADJUSTMENT DISORDER WITH MIXED ANXIETY AND DEPRESSED MOOD: ICD-10-CM

## 2024-09-30 PROCEDURE — 90834 PSYTX W PT 45 MINUTES: CPT | Mod: 95

## 2024-10-01 ENCOUNTER — RESULT REVIEW (OUTPATIENT)
Age: 64
End: 2024-10-01

## 2024-10-01 PROBLEM — F43.23 ACUTE ADJUSTMENT DISORDER WITH MIXED ANXIETY AND DEPRESSED MOOD: Status: RESOLVED | Noted: 2024-05-22 | Resolved: 2024-10-01

## 2024-10-04 ENCOUNTER — OUTPATIENT (OUTPATIENT)
Dept: OUTPATIENT SERVICES | Facility: HOSPITAL | Age: 64
LOS: 1 days | End: 2024-10-04

## 2024-10-04 ENCOUNTER — APPOINTMENT (OUTPATIENT)
Dept: CT IMAGING | Facility: CLINIC | Age: 64
End: 2024-10-04
Payer: MEDICAID

## 2024-10-04 DIAGNOSIS — Z90.49 ACQUIRED ABSENCE OF OTHER SPECIFIED PARTS OF DIGESTIVE TRACT: Chronic | ICD-10-CM

## 2024-10-04 DIAGNOSIS — Z98.891 HISTORY OF UTERINE SCAR FROM PREVIOUS SURGERY: Chronic | ICD-10-CM

## 2024-10-04 PROCEDURE — 74177 CT ABD & PELVIS W/CONTRAST: CPT | Mod: 26

## 2024-10-08 DIAGNOSIS — C80.1 MALIGNANT (PRIMARY) NEOPLASM, UNSPECIFIED: ICD-10-CM

## 2024-10-09 ENCOUNTER — APPOINTMENT (OUTPATIENT)
Dept: GYNECOLOGIC ONCOLOGY | Facility: CLINIC | Age: 64
End: 2024-10-09
Payer: MEDICAID

## 2024-10-09 VITALS
WEIGHT: 234 LBS | HEART RATE: 70 BPM | SYSTOLIC BLOOD PRESSURE: 135 MMHG | OXYGEN SATURATION: 98 % | DIASTOLIC BLOOD PRESSURE: 86 MMHG | BODY MASS INDEX: 38.52 KG/M2 | TEMPERATURE: 97.2 F | HEIGHT: 65.5 IN

## 2024-10-09 PROCEDURE — 99214 OFFICE O/P EST MOD 30 MIN: CPT

## 2024-10-09 PROCEDURE — 99459 PELVIC EXAMINATION: CPT

## 2024-10-10 LAB
ALBUMIN SERPL ELPH-MCNC: 4.4 G/DL
ALP BLD-CCNC: 61 U/L
ALT SERPL-CCNC: 70 U/L
ANION GAP SERPL CALC-SCNC: 14 MMOL/L
AST SERPL-CCNC: 70 U/L
BASOPHILS # BLD AUTO: 0.02 K/UL
BASOPHILS NFR BLD AUTO: 0.5 %
BILIRUB SERPL-MCNC: 0.3 MG/DL
BUN SERPL-MCNC: 13 MG/DL
CALCIUM SERPL-MCNC: 9.6 MG/DL
CANCER AG125 SERPL-ACNC: 5 U/ML
CHLORIDE SERPL-SCNC: 99 MMOL/L
CO2 SERPL-SCNC: 27 MMOL/L
CREAT SERPL-MCNC: 0.79 MG/DL
EGFR: 84 ML/MIN/1.73M2
EOSINOPHIL # BLD AUTO: 0.05 K/UL
EOSINOPHIL NFR BLD AUTO: 1.4 %
HCT VFR BLD CALC: 33.4 %
HGB BLD-MCNC: 10.5 G/DL
IMM GRANULOCYTES NFR BLD AUTO: 0.3 %
LYMPHOCYTES # BLD AUTO: 1.34 K/UL
LYMPHOCYTES NFR BLD AUTO: 36.2 %
MAGNESIUM SERPL-MCNC: 1.9 MG/DL
MAN DIFF?: NORMAL
MCHC RBC-ENTMCNC: 31.4 GM/DL
MCHC RBC-ENTMCNC: 31.5 PG
MCV RBC AUTO: 100.3 FL
MONOCYTES # BLD AUTO: 0.36 K/UL
MONOCYTES NFR BLD AUTO: 9.7 %
NEUTROPHILS # BLD AUTO: 1.92 K/UL
NEUTROPHILS NFR BLD AUTO: 51.9 %
PLATELET # BLD AUTO: 159 K/UL
POTASSIUM SERPL-SCNC: 3.3 MMOL/L
PROT SERPL-MCNC: 7.8 G/DL
RBC # BLD: 3.33 M/UL
RBC # FLD: 15.9 %
SODIUM SERPL-SCNC: 140 MMOL/L
WBC # FLD AUTO: 3.7 K/UL

## 2024-10-14 ENCOUNTER — NON-APPOINTMENT (OUTPATIENT)
Age: 64
End: 2024-10-14

## 2024-10-14 ENCOUNTER — APPOINTMENT (OUTPATIENT)
Dept: RADIATION ONCOLOGY | Facility: CLINIC | Age: 64
End: 2024-10-14
Payer: MEDICAID

## 2024-10-14 VITALS
TEMPERATURE: 98.1 F | OXYGEN SATURATION: 100 % | HEART RATE: 88 BPM | SYSTOLIC BLOOD PRESSURE: 123 MMHG | RESPIRATION RATE: 16 BRPM | DIASTOLIC BLOOD PRESSURE: 87 MMHG

## 2024-10-14 PROCEDURE — 99024 POSTOP FOLLOW-UP VISIT: CPT

## 2024-10-14 RX ORDER — SEMAGLUTIDE 1.34 MG/ML
INJECTION, SOLUTION SUBCUTANEOUS
Refills: 0 | Status: ACTIVE | COMMUNITY

## 2024-10-14 RX ORDER — TRIAMTERENE 50 MG/1
CAPSULE ORAL
Refills: 0 | Status: ACTIVE | COMMUNITY

## 2024-10-16 NOTE — H&P ADULT - NSHPREVIEWOFSYSTEMS_GEN_ALL_CORE
Phone complete  Information to Prepare you for your Surgery    PRE-ADMIT TESTING & SURGERY  [address] [facility] BUILDING  ENTRANCE 2       Your surgery has been scheduled at [facility]. We are pleased to have the opportunity to serve you. For Further Information please call [phone].    On the day of surgery please report to the Registration Desk on the 1st floor of the [facility]. This is a 4 story red brick building on the corner of [facility]. Turn onto [address]  to access the parking lot behind the [facility] at the corner of [facility].    CONTACT YOUR PHYSICIAN'S OFFICE THE DAY PRIOR TO YOUR SURGERY TO OBTAIN YOUR ARRIVAL TIME.  Dr. Tolentino [phone]    The evening before surgery do not eat anything after 9 p.m. ( this includes hard candy, chewing gum and mints).  You may only have GATORADE, POWERADE AND WATER  from 9 p.m. until you leave your home.   DO NOT DRINK ANY LIQUIDS ON THE WAY TO THE HOSPITAL.      Why does your anesthesiologist allow you to drink Gatorade/Powerade before surgery?  Gatorade/Powerade helps to increase your comfort before surgery and to decrease your nausea after surgery.   The carbohydrates in Gatorade/Powerade help reduce your body's stress response to surgery.    If you are a diabetic-drink only water prior to surgery.    Outpatient Surgery- May allow 2 adults (18 and older)/ Support Persons (1 being the designated ) for all surgical/procedural patients. A breastfeeding mother will be allowed her infant and 2 adult Support Persons. No one under the age of 18 will be allowed in the building.    Surgery Patients:  If you take ASPIRIN - Your PHYSICIAN/SURGEON will need to inform you IF/OR when you need to stop taking aspirin prior to your surgery.     Starting the week prior to surgery, do not take any medications containing IBUPROFEN or NSAIDS (Advil, Aleve, BC, Goody's, Ketorolac, Meloxicam, Mobic, Motrin, Naproxen,  Toradol, etc).  If you are not sure if you should take a medicine, please call your surgeon's office.  You may take Tylenol.    Do Not Wear any make-up (especially eye make-up) to surgery. Please remove any false eyelashes or eyelash extensions. If you arrive the day of surgery with makeup/eyelashes on you will be required to remove prior to surgery. (There is a risk of corneal abrasions if eye makeup/eyelash extensions are not removed)    Leave all valuables at home.   Do Not wear any jewelry or watches, including any metal in body piercings. Jewelry must be removed prior to coming to the hospital.  There is a possibility that rings that are unable to be removed may be cut off if they are on the surgical extremity.    Please remove all hair extensions, wigs, clips and any other metal accessories/ ornaments from your hair.  These items may pose a flammable/fire risk in Surgery and must be removed.    Do not shave your surgical area at least 5 days prior to your surgery. The surgical prep will be performed at the hospital according to Infection Control regulations.    Contact Lens must be removed before surgery. Either do not wear the contact lens or bring a case and solution for storage.  Please bring a container for eyeglasses or dentures as required.  Bring any paperwork your physician has provided, such as consent forms,  history and physicals, doctor's orders, etc.   Bring comfortable clothes that are loose fitting to wear upon discharge. Take into consideration the type of surgery being performed.  Maintain your diet as advised per your physician the day prior to surgery.    Adequate rest the night before surgery is advised.   Park in the Parking lot behind the hospital or in the [place] across the street from the parking lot. Parking is complimentary.  If you will be discharged the same day as your procedure, please arrange for a responsible adult to drive you home or to accompany you if  traveling by taxi.   YOU WILL NOT BE PERMITTED TO DRIVE OR TO LEAVE THE HOSPITAL ALONE AFTER SURGERY.   If you are being discharged the same day, it is strongly recommended that you arrange for someone to remain with you for the first 24 hrs following your surgery.    The Surgeon will speak to your family/visitor after your surgery regarding the outcome of your surgery and post op care.  The Surgeon may speak to you after your surgery, but there is a possibility you may not remember the details.  Please check with your family members regarding the conversation with the Surgeon.    We strongly recommend whoever is bringing you home be present for discharge instructions.  This will ensure a thorough understanding for your post op home care.    If the patient has fever, cough, or signs/symptoms of Flu or Covid please do not come in for your surgery.   Contact your surgeon and your primary care physician for further instructions.               Bathing Instructions  Shower (no bath) the evening before and the morning of your procedure with antibacterial soap.  Wash your face with water and your regular face wash/soap  Use your regular shampoo  Dry off as usual Do not use any deodorant, powder, body lotions, perfume, after shave or cologne.     Thanks,  CARLOS Woodward   Denies the following: constitutional symptoms, visual symptoms, cardiovascular symptoms, respiratory symptoms, GI symptoms, musculoskeletal symptoms, skin symptoms, neurologic symptoms, hematologic symptoms, allergic symptoms, psychiatric symptoms  Except any pertinent positives listed.

## 2024-10-17 ENCOUNTER — OUTPATIENT (OUTPATIENT)
Dept: OUTPATIENT SERVICES | Facility: HOSPITAL | Age: 64
LOS: 1 days | End: 2024-10-17

## 2024-10-17 ENCOUNTER — APPOINTMENT (OUTPATIENT)
Dept: MRI IMAGING | Facility: CLINIC | Age: 64
End: 2024-10-17

## 2024-10-17 DIAGNOSIS — Z90.49 ACQUIRED ABSENCE OF OTHER SPECIFIED PARTS OF DIGESTIVE TRACT: Chronic | ICD-10-CM

## 2024-10-17 DIAGNOSIS — Z98.891 HISTORY OF UTERINE SCAR FROM PREVIOUS SURGERY: Chronic | ICD-10-CM

## 2024-10-17 PROCEDURE — 74183 MRI ABD W/O CNTR FLWD CNTR: CPT | Mod: 26

## 2024-10-23 ENCOUNTER — TRANSCRIPTION ENCOUNTER (OUTPATIENT)
Age: 64
End: 2024-10-23

## 2025-01-13 ENCOUNTER — APPOINTMENT (OUTPATIENT)
Dept: GYNECOLOGIC ONCOLOGY | Facility: CLINIC | Age: 65
End: 2025-01-13
Payer: MEDICAID

## 2025-01-13 VITALS
HEIGHT: 65.5 IN | SYSTOLIC BLOOD PRESSURE: 115 MMHG | HEART RATE: 86 BPM | OXYGEN SATURATION: 97 % | DIASTOLIC BLOOD PRESSURE: 79 MMHG | BODY MASS INDEX: 36.05 KG/M2 | TEMPERATURE: 97.3 F | WEIGHT: 220 LBS

## 2025-01-13 DIAGNOSIS — C80.1 MALIGNANT (PRIMARY) NEOPLASM, UNSPECIFIED: ICD-10-CM

## 2025-01-13 PROCEDURE — 99459 PELVIC EXAMINATION: CPT

## 2025-01-13 PROCEDURE — 99214 OFFICE O/P EST MOD 30 MIN: CPT

## 2025-01-15 DIAGNOSIS — D69.6 THROMBOCYTOPENIA, UNSPECIFIED: ICD-10-CM

## 2025-01-15 LAB
ALBUMIN SERPL ELPH-MCNC: 4 G/DL
ALP BLD-CCNC: 64 U/L
ALT SERPL-CCNC: 45 U/L
ANION GAP SERPL CALC-SCNC: 12 MMOL/L
AST SERPL-CCNC: 59 U/L
BASOPHILS # BLD AUTO: 0.02 K/UL
BASOPHILS NFR BLD AUTO: 0.5 %
BILIRUB SERPL-MCNC: 0.2 MG/DL
BUN SERPL-MCNC: 23 MG/DL
CALCIUM SERPL-MCNC: 9.5 MG/DL
CANCER AG125 SERPL-ACNC: 4 U/ML
CHLORIDE SERPL-SCNC: 102 MMOL/L
CO2 SERPL-SCNC: 24 MMOL/L
CREAT SERPL-MCNC: 0.6 MG/DL
EGFR: 101 ML/MIN/1.73M2
EOSINOPHIL # BLD AUTO: 0.03 K/UL
EOSINOPHIL NFR BLD AUTO: 0.7 %
GLUCOSE SERPL-MCNC: 97 MG/DL
HCT VFR BLD CALC: 36.4 %
HGB BLD-MCNC: 11.5 G/DL
IMM GRANULOCYTES NFR BLD AUTO: 0.2 %
LYMPHOCYTES # BLD AUTO: 1.8 K/UL
LYMPHOCYTES NFR BLD AUTO: 43.7 %
MAN DIFF?: NORMAL
MCHC RBC-ENTMCNC: 30 PG
MCHC RBC-ENTMCNC: 31.6 G/DL
MCV RBC AUTO: 95 FL
MONOCYTES # BLD AUTO: 0.4 K/UL
MONOCYTES NFR BLD AUTO: 9.7 %
NEUTROPHILS # BLD AUTO: 1.86 K/UL
NEUTROPHILS NFR BLD AUTO: 45.2 %
PLATELET # BLD AUTO: 130 K/UL
POTASSIUM SERPL-SCNC: 3.7 MMOL/L
PROT SERPL-MCNC: 8 G/DL
RBC # BLD: 3.83 M/UL
RBC # FLD: 14.2 %
SODIUM SERPL-SCNC: 137 MMOL/L
WBC # FLD AUTO: 4.12 K/UL

## 2025-01-31 ENCOUNTER — TRANSCRIPTION ENCOUNTER (OUTPATIENT)
Age: 65
End: 2025-01-31

## 2025-02-03 ENCOUNTER — TRANSCRIPTION ENCOUNTER (OUTPATIENT)
Age: 65
End: 2025-02-03

## 2025-04-08 ENCOUNTER — RESULT REVIEW (OUTPATIENT)
Age: 65
End: 2025-04-08

## 2025-04-11 ENCOUNTER — OUTPATIENT (OUTPATIENT)
Dept: OUTPATIENT SERVICES | Facility: HOSPITAL | Age: 65
LOS: 1 days | End: 2025-04-11

## 2025-04-11 ENCOUNTER — APPOINTMENT (OUTPATIENT)
Dept: CT IMAGING | Facility: CLINIC | Age: 65
End: 2025-04-11
Payer: MEDICAID

## 2025-04-11 DIAGNOSIS — Z98.891 HISTORY OF UTERINE SCAR FROM PREVIOUS SURGERY: Chronic | ICD-10-CM

## 2025-04-11 DIAGNOSIS — Z90.49 ACQUIRED ABSENCE OF OTHER SPECIFIED PARTS OF DIGESTIVE TRACT: Chronic | ICD-10-CM

## 2025-04-11 PROCEDURE — 74177 CT ABD & PELVIS W/CONTRAST: CPT | Mod: 26

## 2025-04-16 ENCOUNTER — APPOINTMENT (OUTPATIENT)
Dept: GYNECOLOGIC ONCOLOGY | Facility: CLINIC | Age: 65
End: 2025-04-16

## 2025-04-16 ENCOUNTER — NON-APPOINTMENT (OUTPATIENT)
Age: 65
End: 2025-04-16

## 2025-04-16 ENCOUNTER — APPOINTMENT (OUTPATIENT)
Dept: GYNECOLOGIC ONCOLOGY | Facility: CLINIC | Age: 65
End: 2025-04-16
Payer: MEDICAID

## 2025-04-16 VITALS
TEMPERATURE: 98 F | HEIGHT: 65.5 IN | BODY MASS INDEX: 35.23 KG/M2 | OXYGEN SATURATION: 99 % | DIASTOLIC BLOOD PRESSURE: 82 MMHG | SYSTOLIC BLOOD PRESSURE: 136 MMHG | WEIGHT: 214 LBS | HEART RATE: 88 BPM

## 2025-04-16 DIAGNOSIS — C80.1 MALIGNANT (PRIMARY) NEOPLASM, UNSPECIFIED: ICD-10-CM

## 2025-04-16 PROCEDURE — 99214 OFFICE O/P EST MOD 30 MIN: CPT

## 2025-04-17 ENCOUNTER — TRANSCRIPTION ENCOUNTER (OUTPATIENT)
Age: 65
End: 2025-04-17

## 2025-04-17 LAB
ALBUMIN SERPL ELPH-MCNC: 4.3 G/DL
ALP BLD-CCNC: 71 U/L
ALT SERPL-CCNC: 30 U/L
ANION GAP SERPL CALC-SCNC: 19 MMOL/L
AST SERPL-CCNC: 29 U/L
BASOPHILS # BLD AUTO: 0.03 K/UL
BASOPHILS NFR BLD AUTO: 0.4 %
BILIRUB SERPL-MCNC: 0.2 MG/DL
BUN SERPL-MCNC: 17 MG/DL
CALCIUM SERPL-MCNC: 9.3 MG/DL
CANCER AG125 SERPL-ACNC: 4 U/ML
CHLORIDE SERPL-SCNC: 101 MMOL/L
CO2 SERPL-SCNC: 19 MMOL/L
CREAT SERPL-MCNC: 0.57 MG/DL
EGFRCR SERPLBLD CKD-EPI 2021: 102 ML/MIN/1.73M2
EOSINOPHIL # BLD AUTO: 0.03 K/UL
EOSINOPHIL NFR BLD AUTO: 0.4 %
GLUCOSE SERPL-MCNC: 90 MG/DL
HCT VFR BLD CALC: 35 %
HGB BLD-MCNC: 11.2 G/DL
IMM GRANULOCYTES NFR BLD AUTO: 0.3 %
LYMPHOCYTES # BLD AUTO: 2.07 K/UL
LYMPHOCYTES NFR BLD AUTO: 30.4 %
MAGNESIUM SERPL-MCNC: 2.1 MG/DL
MAN DIFF?: NORMAL
MCHC RBC-ENTMCNC: 30.9 PG
MCHC RBC-ENTMCNC: 32 G/DL
MCV RBC AUTO: 96.7 FL
MONOCYTES # BLD AUTO: 0.66 K/UL
MONOCYTES NFR BLD AUTO: 9.7 %
NEUTROPHILS # BLD AUTO: 3.99 K/UL
NEUTROPHILS NFR BLD AUTO: 58.8 %
PLATELET # BLD AUTO: 169 K/UL
POTASSIUM SERPL-SCNC: 3.8 MMOL/L
PROT SERPL-MCNC: 7.9 G/DL
RBC # BLD: 3.62 M/UL
RBC # FLD: 14.8 %
SODIUM SERPL-SCNC: 138 MMOL/L
WBC # FLD AUTO: 6.8 K/UL

## 2025-04-22 ENCOUNTER — APPOINTMENT (OUTPATIENT)
Dept: RADIATION ONCOLOGY | Facility: CLINIC | Age: 65
End: 2025-04-22
Payer: MEDICAID

## 2025-04-22 ENCOUNTER — NON-APPOINTMENT (OUTPATIENT)
Age: 65
End: 2025-04-22

## 2025-04-22 VITALS
WEIGHT: 213.19 LBS | OXYGEN SATURATION: 98 % | RESPIRATION RATE: 16 BRPM | TEMPERATURE: 98 F | HEIGHT: 66 IN | SYSTOLIC BLOOD PRESSURE: 124 MMHG | DIASTOLIC BLOOD PRESSURE: 78 MMHG | BODY MASS INDEX: 34.26 KG/M2 | HEART RATE: 78 BPM

## 2025-04-22 PROCEDURE — 99213 OFFICE O/P EST LOW 20 MIN: CPT

## 2025-05-29 NOTE — PATIENT PROFILE ADULT - DEAF OR HARD OF HEARING?
Incoming refill request on patient's medication:    No protocol for requested medication.    Medication:     Disp Refills Start End     amphetamine-dextroamphetamine (ADDERALL) 30 MG tablet 90 tablet 0 7/3/2024 --    Sig: Indications: Attention Deficit Hyperactivity Disorder Begin taking on July 3, 2024. One tablet three time daily. Dx code F90.0.      Last office visit date: 6/11/24  Pharmacy: [id number] - [place], [place] - [address]    Order pended, routed to clinician for review.      Prescriber's Follow Up Recommendation:  rtc     No Show/Late Cancels in Last 12 Months: none     Next Visit Date: 9/11/2024    Verified dosage(s) against: Medication list/Rx history     CONTROLLED:  Last Written Ordered date: 6/25/24  Last PDMP Rx Dispense date (Sold date, if available): 7/4/24    Action Taken: Routed to prescriber to advise/address further   (M6) obeys commands no

## 2025-07-23 ENCOUNTER — APPOINTMENT (OUTPATIENT)
Dept: GYNECOLOGIC ONCOLOGY | Facility: CLINIC | Age: 65
End: 2025-07-23
Payer: MEDICAID

## 2025-07-23 VITALS
OXYGEN SATURATION: 97 % | DIASTOLIC BLOOD PRESSURE: 83 MMHG | TEMPERATURE: 97.6 F | WEIGHT: 216 LBS | HEIGHT: 66 IN | SYSTOLIC BLOOD PRESSURE: 128 MMHG | HEART RATE: 92 BPM | BODY MASS INDEX: 34.72 KG/M2

## 2025-07-23 PROCEDURE — 99214 OFFICE O/P EST MOD 30 MIN: CPT

## 2025-07-24 ENCOUNTER — TRANSCRIPTION ENCOUNTER (OUTPATIENT)
Age: 65
End: 2025-07-24

## 2025-07-24 LAB
ALBUMIN SERPL ELPH-MCNC: 4.3 G/DL
ALP BLD-CCNC: 65 U/L
ALT SERPL-CCNC: 31 U/L
ANION GAP SERPL CALC-SCNC: 16 MMOL/L
AST SERPL-CCNC: 62 U/L
BASOPHILS # BLD AUTO: 0.02 K/UL
BASOPHILS NFR BLD AUTO: 0.4 %
BILIRUB SERPL-MCNC: 0.3 MG/DL
BUN SERPL-MCNC: 17 MG/DL
CALCIUM SERPL-MCNC: 9.7 MG/DL
CANCER AG125 SERPL-ACNC: 7 U/ML
CHLORIDE SERPL-SCNC: 101 MMOL/L
CO2 SERPL-SCNC: 22 MMOL/L
CREAT SERPL-MCNC: 0.56 MG/DL
EGFRCR SERPLBLD CKD-EPI 2021: 102 ML/MIN/1.73M2
EOSINOPHIL # BLD AUTO: 0.06 K/UL
EOSINOPHIL NFR BLD AUTO: 1.3 %
GLUCOSE SERPL-MCNC: 97 MG/DL
HCT VFR BLD CALC: 36.6 %
HGB BLD-MCNC: 11.6 G/DL
IMM GRANULOCYTES NFR BLD AUTO: 0.2 %
LYMPHOCYTES # BLD AUTO: 1.65 K/UL
LYMPHOCYTES NFR BLD AUTO: 36 %
MAGNESIUM SERPL-MCNC: 2.1 MG/DL
MAN DIFF?: NORMAL
MCHC RBC-ENTMCNC: 30.2 PG
MCHC RBC-ENTMCNC: 31.7 G/DL
MCV RBC AUTO: 95.3 FL
MONOCYTES # BLD AUTO: 0.35 K/UL
MONOCYTES NFR BLD AUTO: 7.6 %
NEUTROPHILS # BLD AUTO: 2.49 K/UL
NEUTROPHILS NFR BLD AUTO: 54.5 %
PHOSPHATE SERPL-MCNC: 4.5 MG/DL
PLATELET # BLD AUTO: 158 K/UL
POTASSIUM SERPL-SCNC: 3.8 MMOL/L
PROT SERPL-MCNC: 8.3 G/DL
RBC # BLD: 3.84 M/UL
RBC # FLD: 14.3 %
SODIUM SERPL-SCNC: 140 MMOL/L
WBC # FLD AUTO: 4.58 K/UL

## 2025-08-20 ENCOUNTER — APPOINTMENT (OUTPATIENT)
Dept: INTERVENTIONAL RADIOLOGY/VASCULAR | Facility: HOSPITAL | Age: 65
End: 2025-08-20

## 2025-08-20 ENCOUNTER — OUTPATIENT (OUTPATIENT)
Dept: OUTPATIENT SERVICES | Facility: HOSPITAL | Age: 65
LOS: 1 days | End: 2025-08-20
Payer: MEDICAID

## 2025-08-20 ENCOUNTER — RESULT REVIEW (OUTPATIENT)
Age: 65
End: 2025-08-20

## 2025-08-20 DIAGNOSIS — Z98.891 HISTORY OF UTERINE SCAR FROM PREVIOUS SURGERY: Chronic | ICD-10-CM

## 2025-08-20 DIAGNOSIS — Z90.49 ACQUIRED ABSENCE OF OTHER SPECIFIED PARTS OF DIGESTIVE TRACT: Chronic | ICD-10-CM

## 2025-08-20 PROCEDURE — 36590 REMOVAL TUNNELED CV CATH: CPT
